# Patient Record
Sex: FEMALE | Race: WHITE | Employment: UNEMPLOYED | URBAN - METROPOLITAN AREA
[De-identification: names, ages, dates, MRNs, and addresses within clinical notes are randomized per-mention and may not be internally consistent; named-entity substitution may affect disease eponyms.]

---

## 2024-04-02 ENCOUNTER — HOSPITAL ENCOUNTER (INPATIENT)
Facility: HOSPITAL | Age: 49
LOS: 7 days | Discharge: NURSING, REHAB OR LONG-TERM CARE | DRG: 493 | End: 2024-04-09
Attending: STUDENT IN AN ORGANIZED HEALTH CARE EDUCATION/TRAINING PROGRAM | Admitting: ORTHOPAEDIC SURGERY
Payer: COMMERCIAL

## 2024-04-02 ENCOUNTER — APPOINTMENT (OUTPATIENT)
Facility: HOSPITAL | Age: 49
DRG: 493 | End: 2024-04-02
Payer: COMMERCIAL

## 2024-04-02 DIAGNOSIS — S93.402A SPRAIN OF LEFT ANKLE, UNSPECIFIED LIGAMENT, INITIAL ENCOUNTER: ICD-10-CM

## 2024-04-02 DIAGNOSIS — S82.851A CLOSED RIGHT TRIMALLEOLAR FRACTURE, INITIAL ENCOUNTER: ICD-10-CM
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
## 2024-04-02 DIAGNOSIS — S82.851A CLOSED TRIMALLEOLAR FRACTURE OF RIGHT ANKLE, INITIAL ENCOUNTER: Primary | ICD-10-CM

## 2024-04-02 LAB
ABO + RH BLD: NORMAL
ANION GAP SERPL CALC-SCNC: 6 MMOL/L (ref 5–15)
BASOPHILS # BLD: 0 K/UL (ref 0–0.1)
BASOPHILS NFR BLD: 0 % (ref 0–1)
BLOOD GROUP ANTIBODIES SERPL: NORMAL
BUN SERPL-MCNC: 18 MG/DL (ref 6–20)
BUN/CREAT SERPL: 22 (ref 12–20)
CALCIUM SERPL-MCNC: 9.1 MG/DL (ref 8.5–10.1)
CHLORIDE SERPL-SCNC: 104 MMOL/L (ref 97–108)
CO2 SERPL-SCNC: 25 MMOL/L (ref 21–32)
CREAT SERPL-MCNC: 0.83 MG/DL (ref 0.55–1.02)
DIFFERENTIAL METHOD BLD: ABNORMAL
EOSINOPHIL # BLD: 0 K/UL (ref 0–0.4)
EOSINOPHIL NFR BLD: 0 % (ref 0–7)
ERYTHROCYTE [DISTWIDTH] IN BLOOD BY AUTOMATED COUNT: 14 % (ref 11.5–14.5)
GLUCOSE SERPL-MCNC: 108 MG/DL (ref 65–100)
HCT VFR BLD AUTO: 39.8 % (ref 35–47)
HGB BLD-MCNC: 13.2 G/DL (ref 11.5–16)
IMM GRANULOCYTES # BLD AUTO: 0.1 K/UL (ref 0–0.04)
IMM GRANULOCYTES NFR BLD AUTO: 1 % (ref 0–0.5)
INR PPP: 1 (ref 0.9–1.1)
LYMPHOCYTES # BLD: 2.4 K/UL (ref 0.8–3.5)
LYMPHOCYTES NFR BLD: 15 % (ref 12–49)
MCH RBC QN AUTO: 29.3 PG (ref 26–34)
MCHC RBC AUTO-ENTMCNC: 33.2 G/DL (ref 30–36.5)
MCV RBC AUTO: 88.2 FL (ref 80–99)
MONOCYTES # BLD: 1.4 K/UL (ref 0–1)
MONOCYTES NFR BLD: 8 % (ref 5–13)
NEUTS SEG # BLD: 12.4 K/UL (ref 1.8–8)
NEUTS SEG NFR BLD: 76 % (ref 32–75)
NRBC # BLD: 0 K/UL (ref 0–0.01)
NRBC BLD-RTO: 0 PER 100 WBC
PLATELET # BLD AUTO: 336 K/UL (ref 150–400)
PMV BLD AUTO: 9.2 FL (ref 8.9–12.9)
POTASSIUM SERPL-SCNC: 3.8 MMOL/L (ref 3.5–5.1)
PROTHROMBIN TIME: 10.7 SEC (ref 9–11.1)
RBC # BLD AUTO: 4.51 M/UL (ref 3.8–5.2)
SODIUM SERPL-SCNC: 135 MMOL/L (ref 136–145)
SPECIMEN EXP DATE BLD: NORMAL
WBC # BLD AUTO: 16.4 K/UL (ref 3.6–11)

## 2024-04-02 PROCEDURE — 2580000003 HC RX 258: Performed by: NURSE PRACTITIONER

## 2024-04-02 PROCEDURE — 6360000002 HC RX W HCPCS: Performed by: ORTHOPAEDIC SURGERY

## 2024-04-02 PROCEDURE — 6370000000 HC RX 637 (ALT 250 FOR IP): Performed by: NURSE PRACTITIONER

## 2024-04-02 PROCEDURE — 99285 EMERGENCY DEPT VISIT HI MDM: CPT

## 2024-04-02 PROCEDURE — 86850 RBC ANTIBODY SCREEN: CPT

## 2024-04-02 PROCEDURE — 1100000000 HC RM PRIVATE

## 2024-04-02 PROCEDURE — 6360000002 HC RX W HCPCS: Performed by: NURSE PRACTITIONER

## 2024-04-02 PROCEDURE — 80048 BASIC METABOLIC PNL TOTAL CA: CPT

## 2024-04-02 PROCEDURE — APPNB30 APP NON BILLABLE TIME 0-30 MINS: Performed by: NURSE PRACTITIONER

## 2024-04-02 PROCEDURE — 85025 COMPLETE CBC W/AUTO DIFF WBC: CPT

## 2024-04-02 PROCEDURE — 71045 X-RAY EXAM CHEST 1 VIEW: CPT

## 2024-04-02 PROCEDURE — 86901 BLOOD TYPING SEROLOGIC RH(D): CPT

## 2024-04-02 PROCEDURE — 93005 ELECTROCARDIOGRAM TRACING: CPT | Performed by: STUDENT IN AN ORGANIZED HEALTH CARE EDUCATION/TRAINING PROGRAM

## 2024-04-02 PROCEDURE — 86900 BLOOD TYPING SEROLOGIC ABO: CPT

## 2024-04-02 PROCEDURE — 36415 COLL VENOUS BLD VENIPUNCTURE: CPT

## 2024-04-02 PROCEDURE — 29515 APPLICATION SHORT LEG SPLINT: CPT

## 2024-04-02 PROCEDURE — 85610 PROTHROMBIN TIME: CPT

## 2024-04-02 PROCEDURE — 6360000002 HC RX W HCPCS: Performed by: STUDENT IN AN ORGANIZED HEALTH CARE EDUCATION/TRAINING PROGRAM

## 2024-04-02 RX ORDER — POLYETHYLENE GLYCOL 3350 17 G/17G
17 POWDER, FOR SOLUTION ORAL DAILY
Status: DISCONTINUED | OUTPATIENT
Start: 2024-04-02 | End: 2024-04-09 | Stop reason: HOSPADM

## 2024-04-02 RX ORDER — OXYCODONE HYDROCHLORIDE 5 MG/1
5 TABLET ORAL EVERY 4 HOURS PRN
Status: DISCONTINUED | OUTPATIENT
Start: 2024-04-02 | End: 2024-04-09 | Stop reason: HOSPADM

## 2024-04-02 RX ORDER — ENOXAPARIN SODIUM 100 MG/ML
30 INJECTION SUBCUTANEOUS 2 TIMES DAILY
Status: DISCONTINUED | OUTPATIENT
Start: 2024-04-02 | End: 2024-04-09 | Stop reason: HOSPADM

## 2024-04-02 RX ORDER — ONDANSETRON 4 MG/1
4 TABLET, ORALLY DISINTEGRATING ORAL EVERY 8 HOURS PRN
Status: DISCONTINUED | OUTPATIENT
Start: 2024-04-02 | End: 2024-04-09 | Stop reason: HOSPADM

## 2024-04-02 RX ORDER — ONDANSETRON 2 MG/ML
4 INJECTION INTRAMUSCULAR; INTRAVENOUS EVERY 6 HOURS PRN
Status: DISCONTINUED | OUTPATIENT
Start: 2024-04-02 | End: 2024-04-09 | Stop reason: HOSPADM

## 2024-04-02 RX ORDER — ENOXAPARIN SODIUM 100 MG/ML
30 INJECTION SUBCUTANEOUS 2 TIMES DAILY
Status: DISCONTINUED | OUTPATIENT
Start: 2024-04-02 | End: 2024-04-02

## 2024-04-02 RX ORDER — LOSARTAN POTASSIUM 50 MG/1
100 TABLET ORAL DAILY
Status: DISCONTINUED | OUTPATIENT
Start: 2024-04-03 | End: 2024-04-09 | Stop reason: HOSPADM

## 2024-04-02 RX ORDER — ENOXAPARIN SODIUM 100 MG/ML
40 INJECTION SUBCUTANEOUS DAILY
Status: DISCONTINUED | OUTPATIENT
Start: 2024-04-02 | End: 2024-04-02 | Stop reason: DRUGHIGH

## 2024-04-02 RX ORDER — SODIUM CHLORIDE 9 MG/ML
INJECTION, SOLUTION INTRAVENOUS PRN
Status: DISCONTINUED | OUTPATIENT
Start: 2024-04-02 | End: 2024-04-04 | Stop reason: SDUPTHER

## 2024-04-02 RX ORDER — SODIUM CHLORIDE 0.9 % (FLUSH) 0.9 %
5-40 SYRINGE (ML) INJECTION PRN
Status: DISCONTINUED | OUTPATIENT
Start: 2024-04-02 | End: 2024-04-09 | Stop reason: HOSPADM

## 2024-04-02 RX ORDER — OXYCODONE HYDROCHLORIDE 5 MG/1
10 TABLET ORAL EVERY 4 HOURS PRN
Status: DISCONTINUED | OUTPATIENT
Start: 2024-04-02 | End: 2024-04-09 | Stop reason: HOSPADM

## 2024-04-02 RX ORDER — ONDANSETRON 2 MG/ML
4 INJECTION INTRAMUSCULAR; INTRAVENOUS ONCE
Status: COMPLETED | OUTPATIENT
Start: 2024-04-02 | End: 2024-04-02

## 2024-04-02 RX ORDER — SODIUM CHLORIDE 0.9 % (FLUSH) 0.9 %
5-40 SYRINGE (ML) INJECTION EVERY 12 HOURS SCHEDULED
Status: DISCONTINUED | OUTPATIENT
Start: 2024-04-02 | End: 2024-04-09 | Stop reason: HOSPADM

## 2024-04-02 RX ORDER — ACETAMINOPHEN 325 MG/1
650 TABLET ORAL EVERY 6 HOURS
Status: DISCONTINUED | OUTPATIENT
Start: 2024-04-02 | End: 2024-04-09 | Stop reason: HOSPADM

## 2024-04-02 RX ORDER — SODIUM CHLORIDE 9 MG/ML
INJECTION, SOLUTION INTRAVENOUS CONTINUOUS
Status: DISCONTINUED | OUTPATIENT
Start: 2024-04-02 | End: 2024-04-09 | Stop reason: HOSPADM

## 2024-04-02 RX ADMIN — ACETAMINOPHEN 650 MG: 325 TABLET ORAL at 16:32

## 2024-04-02 RX ADMIN — HYDROMORPHONE HYDROCHLORIDE 0.5 MG: 1 INJECTION, SOLUTION INTRAMUSCULAR; INTRAVENOUS; SUBCUTANEOUS at 13:18

## 2024-04-02 RX ADMIN — SODIUM CHLORIDE: 9 INJECTION, SOLUTION INTRAVENOUS at 21:34

## 2024-04-02 RX ADMIN — HYDROMORPHONE HYDROCHLORIDE 0.5 MG: 1 INJECTION, SOLUTION INTRAMUSCULAR; INTRAVENOUS; SUBCUTANEOUS at 20:45

## 2024-04-02 RX ADMIN — POLYETHYLENE GLYCOL 3350 17 G: 17 POWDER, FOR SOLUTION ORAL at 16:32

## 2024-04-02 RX ADMIN — ONDANSETRON 4 MG: 2 INJECTION INTRAMUSCULAR; INTRAVENOUS at 13:18

## 2024-04-02 RX ADMIN — OXYCODONE 10 MG: 5 TABLET ORAL at 18:56

## 2024-04-02 RX ADMIN — ENOXAPARIN SODIUM 30 MG: 100 INJECTION SUBCUTANEOUS at 21:47

## 2024-04-02 RX ADMIN — HYDROMORPHONE HYDROCHLORIDE 0.5 MG: 1 INJECTION, SOLUTION INTRAMUSCULAR; INTRAVENOUS; SUBCUTANEOUS at 23:31

## 2024-04-02 RX ADMIN — HYDROMORPHONE HYDROCHLORIDE 0.5 MG: 1 INJECTION, SOLUTION INTRAMUSCULAR; INTRAVENOUS; SUBCUTANEOUS at 16:33

## 2024-04-02 RX ADMIN — SODIUM CHLORIDE: 9 INJECTION, SOLUTION INTRAVENOUS at 16:54

## 2024-04-02 RX ADMIN — ACETAMINOPHEN 650 MG: 325 TABLET ORAL at 21:45

## 2024-04-02 ASSESSMENT — PAIN SCALES - GENERAL
PAINLEVEL_OUTOF10: 9
PAINLEVEL_OUTOF10: 10
PAINLEVEL_OUTOF10: 7
PAINLEVEL_OUTOF10: 9
PAINLEVEL_OUTOF10: 8
PAINLEVEL_OUTOF10: 7
PAINLEVEL_OUTOF10: 8

## 2024-04-02 ASSESSMENT — PAIN DESCRIPTION - LOCATION
LOCATION: ANKLE
LOCATION: LEG
LOCATION: ANKLE
LOCATION: ANKLE
LOCATION: ANKLE;HIP
LOCATION: ANKLE
LOCATION: ANKLE
LOCATION: LEG

## 2024-04-02 ASSESSMENT — PAIN DESCRIPTION - ORIENTATION
ORIENTATION: RIGHT;LEFT
ORIENTATION: LEFT;RIGHT
ORIENTATION: RIGHT;LEFT
ORIENTATION: RIGHT
ORIENTATION: RIGHT;LEFT
ORIENTATION: RIGHT;LEFT

## 2024-04-02 ASSESSMENT — PAIN DESCRIPTION - DESCRIPTORS
DESCRIPTORS: STABBING
DESCRIPTORS: ACHING
DESCRIPTORS: ACHING
DESCRIPTORS: POUNDING;STABBING

## 2024-04-02 ASSESSMENT — PAIN DESCRIPTION - PAIN TYPE
TYPE: ACUTE PAIN
TYPE: ACUTE PAIN

## 2024-04-02 ASSESSMENT — PAIN - FUNCTIONAL ASSESSMENT
PAIN_FUNCTIONAL_ASSESSMENT: 0-10
PAIN_FUNCTIONAL_ASSESSMENT: PREVENTS OR INTERFERES SOME ACTIVE ACTIVITIES AND ADLS

## 2024-04-02 ASSESSMENT — PAIN DESCRIPTION - FREQUENCY: FREQUENCY: CONTINUOUS

## 2024-04-02 NOTE — ED PROVIDER NOTES
[facility] EMERGENCY DEPT  EMERGENCY DEPARTMENT ENCOUNTER      Pt Name: Lola Gupta  MRN: [id number]  Birthdate 1975  Date of evaluation: 4/2/2024  Provider: Jennifer Chase DO    CHIEF COMPLAINT       Chief Complaint   Patient presents with    Leg Pain         HISTORY OF PRESENT ILLNESS    HPI    Lola Gupta is a 48 y.o. female with a history of hypertension, hyperlipidemia, narcolepsy who presents to the emergency department for admission.  Patient sustained a fall yesterday evening, was seen at an outside hospital ([facility]) and was diagnosed with a right trimalleolar fracture and left ankle sprain.  She has been unable to ambulate given the bilateral ankle injury.  She was seen at [facility] today, Dr. Cárdenas, with plan for surgery on Thursday but was referred to the emergency department for admission given her inability to ambulate.  She denies any other trauma or injury with    Nursing Notes were reviewed.    REVIEW OF SYSTEMS       Review of Systems   Constitutional:  Negative for fever.   Eyes:  Negative for pain and discharge.   Musculoskeletal:  Positive for arthralgias.   Neurological:  Negative for syncope.           PAST MEDICAL HISTORY   No past medical history on file.      SURGICAL HISTORY     No past surgical history on file.      CURRENT MEDICATIONS       Previous Medications    No medications on file       ALLERGIES     Patient has no known allergies.    FAMILY HISTORY     No family history on file.       SOCIAL HISTORY       Social History     Socioeconomic History    Marital status:            PHYSICAL EXAM       ED Triage Vitals [04/02/24 1231]   BP Temp Temp Source Pulse Respirations SpO2 Height Weight - Scale   123/80 97.9 °F (36.6 °C) Oral 98 16 97 % 1.702 m (5' 7\") 102.1 kg (225 lb)       Body mass index is 35.24 kg/m².    Physical Exam  Vitals and nursing note reviewed.   Constitutional:       General: She is in acute distress.      Appearance: Normal

## 2024-04-02 NOTE — CONSULTS
Pt seen and admitted to Dr. Cárdenas with plan for ORIF Thursday, please see H&P for full plan of care

## 2024-04-02 NOTE — H&P
ORTHOPAEDIC CONSULT NOTE    Subjective:     Date of Consultation:  April 2, 2024      Lola Gupta is a 48 y.o. female with PMH of narcolepsy, HTN who is being seen for admission after an office visit today with Dr. Cárdenas for R ankle fracture and L ankle sprain. Pt reports she was seen last PM in the ED at [facility] after fall, she missed a step and fell. Work up at that time reveled a L ankle sprain and a R trimal fracture that was splinted. Pt was then sent to home with plan to follow up with Dr. Cárdenas in the office for surgical planning, pt was then sent to the ED at  due to pain and inability to care for herself at home due to bilat ankle injuries. Pt has not had pain medications since leaving the ED and reports pain 10/10 at this time.     Patient Active Problem List    Diagnosis Date Noted    Closed right trimalleolar fracture 04/02/2024    Left ankle sprain 04/02/2024    Closed right trimalleolar fracture, initial encounter 04/02/2024     No family history on file.   Social History     Tobacco Use    Smoking status: Not on file    Smokeless tobacco: Not on file   Substance Use Topics    Alcohol use: Not on file     Past Medical History:   Diagnosis Date    HTN (hypertension)     Narcolepsy       No past surgical history on file.   Prior to Admission medications    Not on File     Current Facility-Administered Medications   Medication Dose Route Frequency    sodium chloride flush 0.9 % injection 5-40 mL  5-40 mL IntraVENous 2 times per day    sodium chloride flush 0.9 % injection 5-40 mL  5-40 mL IntraVENous PRN    0.9 % sodium chloride infusion   IntraVENous PRN    ondansetron (ZOFRAN-ODT) disintegrating tablet 4 mg  4 mg Oral Q8H PRN    Or    ondansetron (ZOFRAN) injection 4 mg  4 mg IntraVENous Q6H PRN    polyethylene glycol (GLYCOLAX) packet 17 g  17 g Oral Daily    0.9 % sodium chloride infusion   IntraVENous Continuous    acetaminophen (TYLENOL) tablet 650 mg  650 mg Oral Q6H    oxyCODONE

## 2024-04-02 NOTE — ED NOTES
Bedside and Verbal shift change report given to Carol (oncoming nurse) by Megan (offgoing nurse). Report included the following information Nurse Handoff Report, ED Encounter Summary, MAR, and Recent Results.

## 2024-04-02 NOTE — ED TRIAGE NOTES
Patient reports she fell down stairs last night and has a fracture in her right leg and a sprain in her left ankle- reports she is scheduled to have surgery on Thursday.    Patient arrived with a splint to her right lower leg- swelling and ecchymosis noted to the left ankle.    Reports she is here to be admitted until surgery because she is unable to bear weight on her legs.

## 2024-04-03 LAB
ANION GAP SERPL CALC-SCNC: 3 MMOL/L (ref 5–15)
APPEARANCE UR: ABNORMAL
BACTERIA URNS QL MICRO: ABNORMAL /HPF
BASOPHILS # BLD: 0 K/UL (ref 0–0.1)
BASOPHILS NFR BLD: 0 % (ref 0–1)
BILIRUB UR QL: NEGATIVE
BUN SERPL-MCNC: 12 MG/DL (ref 6–20)
BUN/CREAT SERPL: 21 (ref 12–20)
CALCIUM SERPL-MCNC: 8.2 MG/DL (ref 8.5–10.1)
CHLORIDE SERPL-SCNC: 105 MMOL/L (ref 97–108)
CO2 SERPL-SCNC: 28 MMOL/L (ref 21–32)
COLOR UR: YELLOW
CREAT SERPL-MCNC: 0.56 MG/DL (ref 0.55–1.02)
DIFFERENTIAL METHOD BLD: ABNORMAL
EKG ATRIAL RATE: 95 BPM
EKG DIAGNOSIS: NORMAL
EKG P AXIS: 83 DEGREES
EKG P-R INTERVAL: 112 MS
EKG Q-T INTERVAL: 338 MS
EKG QRS DURATION: 82 MS
EKG QTC CALCULATION (BAZETT): 424 MS
EKG R AXIS: 5 DEGREES
EKG T AXIS: 16 DEGREES
EKG VENTRICULAR RATE: 95 BPM
EOSINOPHIL # BLD: 0.2 K/UL (ref 0–0.4)
EOSINOPHIL NFR BLD: 2 % (ref 0–7)
EPITH CASTS URNS QL MICRO: ABNORMAL /LPF
ERYTHROCYTE [DISTWIDTH] IN BLOOD BY AUTOMATED COUNT: 13.9 % (ref 11.5–14.5)
GLUCOSE BLD STRIP.AUTO-MCNC: 103 MG/DL (ref 65–117)
GLUCOSE SERPL-MCNC: 109 MG/DL (ref 65–100)
GLUCOSE UR STRIP.AUTO-MCNC: NEGATIVE MG/DL
HCT VFR BLD AUTO: 34.5 % (ref 35–47)
HGB BLD-MCNC: 11.4 G/DL (ref 11.5–16)
HGB UR QL STRIP: ABNORMAL
IMM GRANULOCYTES # BLD AUTO: 0 K/UL (ref 0–0.04)
IMM GRANULOCYTES NFR BLD AUTO: 0 % (ref 0–0.5)
KETONES UR QL STRIP.AUTO: NEGATIVE MG/DL
LEUKOCYTE ESTERASE UR QL STRIP.AUTO: ABNORMAL
LYMPHOCYTES # BLD: 2.4 K/UL (ref 0.8–3.5)
LYMPHOCYTES NFR BLD: 20 % (ref 12–49)
MCH RBC QN AUTO: 29.5 PG (ref 26–34)
MCHC RBC AUTO-ENTMCNC: 33 G/DL (ref 30–36.5)
MCV RBC AUTO: 89.1 FL (ref 80–99)
MONOCYTES # BLD: 1.2 K/UL (ref 0–1)
MONOCYTES NFR BLD: 10 % (ref 5–13)
NEUTS SEG # BLD: 7.9 K/UL (ref 1.8–8)
NEUTS SEG NFR BLD: 68 % (ref 32–75)
NITRITE UR QL STRIP.AUTO: NEGATIVE
NRBC # BLD: 0 K/UL (ref 0–0.01)
NRBC BLD-RTO: 0 PER 100 WBC
PH UR STRIP: 5 (ref 5–8)
PLATELET # BLD AUTO: 270 K/UL (ref 150–400)
PMV BLD AUTO: 9.7 FL (ref 8.9–12.9)
POTASSIUM SERPL-SCNC: 3.8 MMOL/L (ref 3.5–5.1)
PROT UR STRIP-MCNC: ABNORMAL MG/DL
RBC # BLD AUTO: 3.87 M/UL (ref 3.8–5.2)
RBC #/AREA URNS HPF: ABNORMAL /HPF (ref 0–5)
SERVICE CMNT-IMP: NORMAL
SODIUM SERPL-SCNC: 136 MMOL/L (ref 136–145)
SP GR UR REFRACTOMETRY: 1.02 (ref 1–1.03)
SPECIMEN HOLD: NORMAL
URINE CULTURE IF INDICATED: ABNORMAL
UROBILINOGEN UR QL STRIP.AUTO: 1 EU/DL (ref 0.2–1)
WBC # BLD AUTO: 11.7 K/UL (ref 3.6–11)
WBC URNS QL MICRO: ABNORMAL /HPF (ref 0–4)

## 2024-04-03 PROCEDURE — 6360000002 HC RX W HCPCS: Performed by: ORTHOPAEDIC SURGERY

## 2024-04-03 PROCEDURE — 85025 COMPLETE CBC W/AUTO DIFF WBC: CPT

## 2024-04-03 PROCEDURE — 93010 ELECTROCARDIOGRAM REPORT: CPT | Performed by: SPECIALIST

## 2024-04-03 PROCEDURE — 6370000000 HC RX 637 (ALT 250 FOR IP): Performed by: NURSE PRACTITIONER

## 2024-04-03 PROCEDURE — 1100000000 HC RM PRIVATE

## 2024-04-03 PROCEDURE — 6360000002 HC RX W HCPCS: Performed by: NURSE PRACTITIONER

## 2024-04-03 PROCEDURE — 80048 BASIC METABOLIC PNL TOTAL CA: CPT

## 2024-04-03 PROCEDURE — 36415 COLL VENOUS BLD VENIPUNCTURE: CPT

## 2024-04-03 PROCEDURE — 94761 N-INVAS EAR/PLS OXIMETRY MLT: CPT

## 2024-04-03 PROCEDURE — 99232 SBSQ HOSP IP/OBS MODERATE 35: CPT | Performed by: PHYSICIAN ASSISTANT

## 2024-04-03 PROCEDURE — 2580000003 HC RX 258: Performed by: NURSE PRACTITIONER

## 2024-04-03 PROCEDURE — 81001 URINALYSIS AUTO W/SCOPE: CPT

## 2024-04-03 PROCEDURE — 87086 URINE CULTURE/COLONY COUNT: CPT

## 2024-04-03 PROCEDURE — 82962 GLUCOSE BLOOD TEST: CPT

## 2024-04-03 RX ORDER — PANTOPRAZOLE SODIUM 40 MG/1
40 TABLET, DELAYED RELEASE ORAL
Status: DISCONTINUED | OUTPATIENT
Start: 2024-04-04 | End: 2024-04-09 | Stop reason: HOSPADM

## 2024-04-03 RX ADMIN — POLYETHYLENE GLYCOL 3350 17 G: 17 POWDER, FOR SOLUTION ORAL at 08:03

## 2024-04-03 RX ADMIN — LOSARTAN POTASSIUM 100 MG: 50 TABLET, FILM COATED ORAL at 08:05

## 2024-04-03 RX ADMIN — ACETAMINOPHEN 650 MG: 325 TABLET ORAL at 21:09

## 2024-04-03 RX ADMIN — HYDROMORPHONE HYDROCHLORIDE 0.5 MG: 1 INJECTION, SOLUTION INTRAMUSCULAR; INTRAVENOUS; SUBCUTANEOUS at 03:47

## 2024-04-03 RX ADMIN — ACETAMINOPHEN 650 MG: 325 TABLET ORAL at 15:18

## 2024-04-03 RX ADMIN — OXYCODONE 10 MG: 5 TABLET ORAL at 18:12

## 2024-04-03 RX ADMIN — ACETAMINOPHEN 650 MG: 325 TABLET ORAL at 11:22

## 2024-04-03 RX ADMIN — ENOXAPARIN SODIUM 30 MG: 100 INJECTION SUBCUTANEOUS at 08:02

## 2024-04-03 RX ADMIN — HYDROMORPHONE HYDROCHLORIDE 0.5 MG: 1 INJECTION, SOLUTION INTRAMUSCULAR; INTRAVENOUS; SUBCUTANEOUS at 15:21

## 2024-04-03 RX ADMIN — SODIUM CHLORIDE, PRESERVATIVE FREE 10 ML: 5 INJECTION INTRAVENOUS at 08:05

## 2024-04-03 RX ADMIN — OXYCODONE 10 MG: 5 TABLET ORAL at 08:03

## 2024-04-03 RX ADMIN — OXYCODONE 10 MG: 5 TABLET ORAL at 13:56

## 2024-04-03 RX ADMIN — SODIUM CHLORIDE: 9 INJECTION, SOLUTION INTRAVENOUS at 13:59

## 2024-04-03 RX ADMIN — SODIUM CHLORIDE: 9 INJECTION, SOLUTION INTRAVENOUS at 06:19

## 2024-04-03 RX ADMIN — ENOXAPARIN SODIUM 30 MG: 100 INJECTION SUBCUTANEOUS at 21:11

## 2024-04-03 RX ADMIN — SODIUM CHLORIDE, PRESERVATIVE FREE 10 ML: 5 INJECTION INTRAVENOUS at 21:12

## 2024-04-03 RX ADMIN — SERTRALINE HYDROCHLORIDE 50 MG: 50 TABLET ORAL at 08:03

## 2024-04-03 RX ADMIN — OXYCODONE 10 MG: 5 TABLET ORAL at 01:39

## 2024-04-03 RX ADMIN — HYDROMORPHONE HYDROCHLORIDE 0.5 MG: 1 INJECTION, SOLUTION INTRAMUSCULAR; INTRAVENOUS; SUBCUTANEOUS at 19:48

## 2024-04-03 RX ADMIN — ACETAMINOPHEN 650 MG: 325 TABLET ORAL at 03:47

## 2024-04-03 RX ADMIN — HYDROMORPHONE HYDROCHLORIDE 0.5 MG: 1 INJECTION, SOLUTION INTRAMUSCULAR; INTRAVENOUS; SUBCUTANEOUS at 11:22

## 2024-04-03 ASSESSMENT — PAIN DESCRIPTION - DESCRIPTORS
DESCRIPTORS: SHARP;THROBBING
DESCRIPTORS: STABBING;THROBBING
DESCRIPTORS: STABBING
DESCRIPTORS: STABBING;THROBBING
DESCRIPTORS: STABBING;THROBBING

## 2024-04-03 ASSESSMENT — PAIN DESCRIPTION - LOCATION
LOCATION: LEG
LOCATION: ANKLE
LOCATION: LEG

## 2024-04-03 ASSESSMENT — PAIN SCALES - GENERAL
PAINLEVEL_OUTOF10: 9
PAINLEVEL_OUTOF10: 10
PAINLEVEL_OUTOF10: 4
PAINLEVEL_OUTOF10: 6
PAINLEVEL_OUTOF10: 9
PAINLEVEL_OUTOF10: 10
PAINLEVEL_OUTOF10: 0
PAINLEVEL_OUTOF10: 0
PAINLEVEL_OUTOF10: 6
PAINLEVEL_OUTOF10: 4
PAINLEVEL_OUTOF10: 10
PAINLEVEL_OUTOF10: 0
PAINLEVEL_OUTOF10: 10
PAINLEVEL_OUTOF10: 5

## 2024-04-03 ASSESSMENT — PAIN DESCRIPTION - ORIENTATION
ORIENTATION: RIGHT
ORIENTATION: RIGHT
ORIENTATION: RIGHT;LEFT
ORIENTATION: RIGHT
ORIENTATION: RIGHT;LEFT
ORIENTATION: RIGHT

## 2024-04-03 ASSESSMENT — PAIN SCALES - WONG BAKER: WONGBAKER_NUMERICALRESPONSE: NO HURT

## 2024-04-03 NOTE — ADT AUTH CERT
Physician Progress Notes  Notes from 03/31/24 through 04/03/24  Progress Notes by Daren Centeno PA at 4/3/2024  1:14 PM    Author: Daren Centeno PA Service: Orthopedics Author Type: Physician Assistant   Filed: 4/3/2024  1:33 PM Date of Service: 4/3/2024  1:14 PM Status: Signed   : Daren Centeno PA (Physician Assistant)      Orthopaedic Progress Note     April 3, 2024 10:54 AM      Patient: Lola Gupta MRN: [id number]  SSN: [id number]    YOB: 1975  Age: 48 y.o.  Sex: female       Admit date:  4/2/2024  Admitting Physician:  Lorenzo Cárdenas MD   Consulting Physician(s): Treatment Team: Attending Provider: Lorenzo Cárdenas MD; Consulting Provider: Marilyn Edmondson APRN - NP; Registered Nurse: Callie Dorsey RN; Patient Care Tech: Edgar Rosado; Physical Therapist: Tashi De Santiago PT; Occupational Therapist: Quynh Webb OT; Utilization Reviewer: Radha Mendoza RN; : Shanda Montalvo     SUBJECTIVE:      Lola Gupta is a 48 y.o. female is resting in bed.  Complains of pain over night, that was not managed per the patient.  No complaints of nausea, vomiting, dizziness, lightheadedness, chest pain, or shortness of breath.     OBJECTIVE:         Physical Exam:  General: Alert, cooperative, no distress.    Respiratory: Respirations unlabored  Neurological:  Neurovascular exam within normal limits.  Motor: + DF/PF.   Musculoskeletal: Calves soft, supple, non-tender upon palpation.  Splint in place right lower extremity.  CDI splint. Wiggles toes on right and left foot. BCR of all digits.  Left ankle with swelling over ATLF.  No distal fibula pain to palpation. +DP and PT pulses.  BCR of all digits.    SILT BL LE.             Vital Signs:          Patient Vitals for the past 8 hrs:    BP Temp Temp src Pulse Resp SpO2   04/03/24 1152 -- -- -- -- 20 --   04/03/24 1133 118/69 97.9 °F (36.6 °C) Oral 79 20 97 %   04/03/24 1122 -- -- -- -- 18 --   04/03/24 0842 --

## 2024-04-03 NOTE — ED NOTES
TRANSFER - OUT REPORT:    Verbal report given to ANITHA Bond  on Lola Gupta  being transferred to [facility] for routine progression of patient care       Report consisted of patient's Situation, Background, Assessment and   Recommendations(SBAR).     Information from the following report(s) Nurse Handoff Report, ED Encounter Summary, ED SBAR, MAR, and Recent Results was reviewed with the receiving nurse.    [place] Fall Assessment:    Presents to emergency department  because of falls (Syncope, seizure, or loss of consciousness): No  Age > 70: No  Altered Mental Status, Intoxication with alcohol or substance confusion (Disorientation, impaired judgment, poor safety awaremess, or inability to follow instructions): No  Impaired Mobility: Ambulates or transfers with assistive devices or assistance; Unable to ambulate or transer.: Yes  Nursing Judgement: Yes          Lines:   Peripheral IV 04/02/24 Proximal;Right Forearm (Active)   Site Assessment Clean, dry & intact 04/02/24 1235   Line Status Blood return noted;Flushed 04/02/24 1235   Phlebitis Assessment No symptoms 04/02/24 1235   Infiltration Assessment 0 04/02/24 1235   Dressing Status New dressing applied 04/02/24 1235   Dressing Type Transparent 04/02/24 1235   Dressing Intervention New 04/02/24 1235        Opportunity for questions and clarification was provided.      Patient transported with:  [employer]

## 2024-04-04 ENCOUNTER — APPOINTMENT (OUTPATIENT)
Facility: HOSPITAL | Age: 49
DRG: 493 | End: 2024-04-04
Payer: COMMERCIAL

## 2024-04-04 ENCOUNTER — ANESTHESIA (OUTPATIENT)
Facility: HOSPITAL | Age: 49
End: 2024-04-04
Payer: COMMERCIAL

## 2024-04-04 ENCOUNTER — ANESTHESIA EVENT (OUTPATIENT)
Facility: HOSPITAL | Age: 49
End: 2024-04-04
Payer: COMMERCIAL

## 2024-04-04 LAB
BACTERIA SPEC CULT: NORMAL
CC UR VC: NORMAL
HCG UR QL: NEGATIVE
SERVICE CMNT-IMP: NORMAL

## 2024-04-04 PROCEDURE — 6360000002 HC RX W HCPCS: Performed by: ORTHOPAEDIC SURGERY

## 2024-04-04 PROCEDURE — 3600000004 HC SURGERY LEVEL 4 BASE: Performed by: ORTHOPAEDIC SURGERY

## 2024-04-04 PROCEDURE — 7100000000 HC PACU RECOVERY - FIRST 15 MIN: Performed by: ORTHOPAEDIC SURGERY

## 2024-04-04 PROCEDURE — 81025 URINE PREGNANCY TEST: CPT

## 2024-04-04 PROCEDURE — 6360000002 HC RX W HCPCS: Performed by: NURSE PRACTITIONER

## 2024-04-04 PROCEDURE — C9290 INJ, BUPIVACAINE LIPOSOME: HCPCS | Performed by: ANESTHESIOLOGY

## 2024-04-04 PROCEDURE — 6360000002 HC RX W HCPCS: Performed by: NURSE ANESTHETIST, CERTIFIED REGISTERED

## 2024-04-04 PROCEDURE — 6370000000 HC RX 637 (ALT 250 FOR IP): Performed by: NURSE PRACTITIONER

## 2024-04-04 PROCEDURE — 1100000000 HC RM PRIVATE

## 2024-04-04 PROCEDURE — 64445 NJX AA&/STRD SCIATIC NRV IMG: CPT | Performed by: ANESTHESIOLOGY

## 2024-04-04 PROCEDURE — 0QSJ04Z REPOSITION RIGHT FIBULA WITH INTERNAL FIXATION DEVICE, OPEN APPROACH: ICD-10-PCS | Performed by: ORTHOPAEDIC SURGERY

## 2024-04-04 PROCEDURE — 2580000003 HC RX 258: Performed by: ANESTHESIOLOGY

## 2024-04-04 PROCEDURE — 2700000000 HC OXYGEN THERAPY PER DAY

## 2024-04-04 PROCEDURE — 2720000010 HC SURG SUPPLY STERILE: Performed by: ORTHOPAEDIC SURGERY

## 2024-04-04 PROCEDURE — 3700000000 HC ANESTHESIA ATTENDED CARE: Performed by: ORTHOPAEDIC SURGERY

## 2024-04-04 PROCEDURE — 6370000000 HC RX 637 (ALT 250 FOR IP): Performed by: PHYSICIAN ASSISTANT

## 2024-04-04 PROCEDURE — 3700000001 HC ADD 15 MINUTES (ANESTHESIA): Performed by: ORTHOPAEDIC SURGERY

## 2024-04-04 PROCEDURE — 2580000003 HC RX 258: Performed by: NURSE PRACTITIONER

## 2024-04-04 PROCEDURE — C1713 ANCHOR/SCREW BN/BN,TIS/BN: HCPCS | Performed by: ORTHOPAEDIC SURGERY

## 2024-04-04 PROCEDURE — 6360000002 HC RX W HCPCS: Performed by: ANESTHESIOLOGY

## 2024-04-04 PROCEDURE — 6360000002 HC RX W HCPCS: Performed by: PHYSICIAN ASSISTANT

## 2024-04-04 PROCEDURE — BW1C1ZZ FLUOROSCOPY OF LOWER EXTREMITY USING LOW OSMOLAR CONTRAST: ICD-10-PCS | Performed by: ORTHOPAEDIC SURGERY

## 2024-04-04 PROCEDURE — 2580000003 HC RX 258: Performed by: ORTHOPAEDIC SURGERY

## 2024-04-04 PROCEDURE — 0QSG04Z REPOSITION RIGHT TIBIA WITH INTERNAL FIXATION DEVICE, OPEN APPROACH: ICD-10-PCS | Performed by: ORTHOPAEDIC SURGERY

## 2024-04-04 PROCEDURE — 7100000001 HC PACU RECOVERY - ADDTL 15 MIN: Performed by: ORTHOPAEDIC SURGERY

## 2024-04-04 PROCEDURE — 2709999900 HC NON-CHARGEABLE SUPPLY: Performed by: ORTHOPAEDIC SURGERY

## 2024-04-04 PROCEDURE — 94761 N-INVAS EAR/PLS OXIMETRY MLT: CPT

## 2024-04-04 PROCEDURE — 3600000014 HC SURGERY LEVEL 4 ADDTL 15MIN: Performed by: ORTHOPAEDIC SURGERY

## 2024-04-04 PROCEDURE — 73600 X-RAY EXAM OF ANKLE: CPT

## 2024-04-04 PROCEDURE — 2500000003 HC RX 250 WO HCPCS: Performed by: NURSE ANESTHETIST, CERTIFIED REGISTERED

## 2024-04-04 PROCEDURE — 2580000003 HC RX 258: Performed by: PHYSICIAN ASSISTANT

## 2024-04-04 DEVICE — PLATE BNE 3 H MED S STL LOK FOR ANK FRAC MGMT: Type: IMPLANTABLE DEVICE | Site: ANKLE | Status: FUNCTIONAL

## 2024-04-04 DEVICE — KNOTLESS T-ROPE SYN-DESMOSIS REPR TI
Type: IMPLANTABLE DEVICE | Site: ANKLE | Status: FUNCTIONAL
Brand: ARTHREX®

## 2024-04-04 DEVICE — BONE SCREW
Type: IMPLANTABLE DEVICE | Site: ANKLE | Status: FUNCTIONAL
Brand: VARIAX

## 2024-04-04 DEVICE — DISTAL LATERAL FIBULA PLATE, 5 HOLE
Type: IMPLANTABLE DEVICE | Site: ANKLE | Status: FUNCTIONAL
Brand: VARIAX

## 2024-04-04 DEVICE — LOCKING SCREW
Type: IMPLANTABLE DEVICE | Site: ANKLE | Status: FUNCTIONAL
Brand: VARIAX

## 2024-04-04 DEVICE — SCREW BNE L30MM DIA3.5MM CORT ANK S STL NONLOCKING LO PROF: Type: IMPLANTABLE DEVICE | Site: ANKLE | Status: FUNCTIONAL

## 2024-04-04 RX ORDER — DIPHENHYDRAMINE HYDROCHLORIDE 50 MG/ML
12.5 INJECTION INTRAMUSCULAR; INTRAVENOUS
Status: DISCONTINUED | OUTPATIENT
Start: 2024-04-04 | End: 2024-04-04 | Stop reason: HOSPADM

## 2024-04-04 RX ORDER — LIDOCAINE HYDROCHLORIDE 20 MG/ML
INJECTION, SOLUTION EPIDURAL; INFILTRATION; INTRACAUDAL; PERINEURAL PRN
Status: DISCONTINUED | OUTPATIENT
Start: 2024-04-04 | End: 2024-04-04 | Stop reason: SDUPTHER

## 2024-04-04 RX ORDER — MIDAZOLAM HYDROCHLORIDE 1 MG/ML
INJECTION INTRAMUSCULAR; INTRAVENOUS PRN
Status: DISCONTINUED | OUTPATIENT
Start: 2024-04-04 | End: 2024-04-04 | Stop reason: SDUPTHER

## 2024-04-04 RX ORDER — OLMESARTAN MEDOXOMIL 40 MG/1
40 TABLET ORAL DAILY
COMMUNITY
Start: 2024-02-10

## 2024-04-04 RX ORDER — SODIUM CHLORIDE 9 MG/ML
INJECTION, SOLUTION INTRAVENOUS PRN
Status: DISCONTINUED | OUTPATIENT
Start: 2024-04-04 | End: 2024-04-09 | Stop reason: HOSPADM

## 2024-04-04 RX ORDER — PITOLISANT HYDROCHLORIDE 17.8 MG/1
TABLET, FILM COATED ORAL
COMMUNITY
Start: 2024-03-11

## 2024-04-04 RX ORDER — SODIUM CHLORIDE, SODIUM LACTATE, POTASSIUM CHLORIDE, CALCIUM CHLORIDE 600; 310; 30; 20 MG/100ML; MG/100ML; MG/100ML; MG/100ML
INJECTION, SOLUTION INTRAVENOUS CONTINUOUS
Status: DISCONTINUED | OUTPATIENT
Start: 2024-04-04 | End: 2024-04-04 | Stop reason: HOSPADM

## 2024-04-04 RX ORDER — LIDOCAINE HYDROCHLORIDE 10 MG/ML
1 INJECTION, SOLUTION EPIDURAL; INFILTRATION; INTRACAUDAL; PERINEURAL
Status: DISCONTINUED | OUTPATIENT
Start: 2024-04-04 | End: 2024-04-04 | Stop reason: HOSPADM

## 2024-04-04 RX ORDER — SODIUM CHLORIDE 0.9 % (FLUSH) 0.9 %
5-40 SYRINGE (ML) INJECTION PRN
Status: DISCONTINUED | OUTPATIENT
Start: 2024-04-04 | End: 2024-04-09 | Stop reason: HOSPADM

## 2024-04-04 RX ORDER — PROPOFOL 10 MG/ML
INJECTION, EMULSION INTRAVENOUS PRN
Status: DISCONTINUED | OUTPATIENT
Start: 2024-04-04 | End: 2024-04-04 | Stop reason: SDUPTHER

## 2024-04-04 RX ORDER — DEXAMETHASONE SODIUM PHOSPHATE 4 MG/ML
INJECTION, SOLUTION INTRA-ARTICULAR; INTRALESIONAL; INTRAMUSCULAR; INTRAVENOUS; SOFT TISSUE PRN
Status: DISCONTINUED | OUTPATIENT
Start: 2024-04-04 | End: 2024-04-04 | Stop reason: SDUPTHER

## 2024-04-04 RX ORDER — FENTANYL CITRATE 50 UG/ML
100 INJECTION, SOLUTION INTRAMUSCULAR; INTRAVENOUS
Status: DISCONTINUED | OUTPATIENT
Start: 2024-04-04 | End: 2024-04-04 | Stop reason: HOSPADM

## 2024-04-04 RX ORDER — ONDANSETRON 2 MG/ML
INJECTION INTRAMUSCULAR; INTRAVENOUS PRN
Status: DISCONTINUED | OUTPATIENT
Start: 2024-04-04 | End: 2024-04-04 | Stop reason: SDUPTHER

## 2024-04-04 RX ORDER — DEXTROAMPHETAMINE SACCHARATE, AMPHETAMINE ASPARTATE, DEXTROAMPHETAMINE SULFATE AND AMPHETAMINE SULFATE 2.5; 2.5; 2.5; 2.5 MG/1; MG/1; MG/1; MG/1
TABLET ORAL
COMMUNITY
Start: 2024-02-23

## 2024-04-04 RX ORDER — ONDANSETRON 2 MG/ML
4 INJECTION INTRAMUSCULAR; INTRAVENOUS
Status: DISCONTINUED | OUTPATIENT
Start: 2024-04-04 | End: 2024-04-04 | Stop reason: HOSPADM

## 2024-04-04 RX ORDER — ROPIVACAINE HYDROCHLORIDE 5 MG/ML
INJECTION, SOLUTION EPIDURAL; INFILTRATION; PERINEURAL PRN
Status: DISCONTINUED | OUTPATIENT
Start: 2024-04-04 | End: 2024-04-04 | Stop reason: SDUPTHER

## 2024-04-04 RX ORDER — SODIUM CHLORIDE 0.9 % (FLUSH) 0.9 %
5-40 SYRINGE (ML) INJECTION EVERY 12 HOURS SCHEDULED
Status: DISCONTINUED | OUTPATIENT
Start: 2024-04-04 | End: 2024-04-09 | Stop reason: HOSPADM

## 2024-04-04 RX ORDER — MIDAZOLAM HYDROCHLORIDE 2 MG/2ML
2 INJECTION, SOLUTION INTRAMUSCULAR; INTRAVENOUS
Status: DISCONTINUED | OUTPATIENT
Start: 2024-04-04 | End: 2024-04-04 | Stop reason: HOSPADM

## 2024-04-04 RX ORDER — FENTANYL CITRATE 50 UG/ML
INJECTION, SOLUTION INTRAMUSCULAR; INTRAVENOUS PRN
Status: DISCONTINUED | OUTPATIENT
Start: 2024-04-04 | End: 2024-04-04 | Stop reason: SDUPTHER

## 2024-04-04 RX ORDER — NALOXONE HYDROCHLORIDE 0.4 MG/ML
INJECTION, SOLUTION INTRAMUSCULAR; INTRAVENOUS; SUBCUTANEOUS PRN
Status: DISCONTINUED | OUTPATIENT
Start: 2024-04-04 | End: 2024-04-04 | Stop reason: HOSPADM

## 2024-04-04 RX ORDER — BUPIVACAINE HYDROCHLORIDE 2.5 MG/ML
INJECTION, SOLUTION EPIDURAL; INFILTRATION; INTRACAUDAL PRN
Status: DISCONTINUED | OUTPATIENT
Start: 2024-04-04 | End: 2024-04-04 | Stop reason: SDUPTHER

## 2024-04-04 RX ADMIN — SODIUM CHLORIDE, PRESERVATIVE FREE 10 ML: 5 INJECTION INTRAVENOUS at 20:06

## 2024-04-04 RX ADMIN — MIDAZOLAM HYDROCHLORIDE 2 MG: 1 INJECTION, SOLUTION INTRAMUSCULAR; INTRAVENOUS at 10:43

## 2024-04-04 RX ADMIN — FENTANYL CITRATE 50 MCG: 50 INJECTION, SOLUTION INTRAMUSCULAR; INTRAVENOUS at 12:06

## 2024-04-04 RX ADMIN — MIDAZOLAM HYDROCHLORIDE 2 MG: 1 INJECTION, SOLUTION INTRAMUSCULAR; INTRAVENOUS at 11:20

## 2024-04-04 RX ADMIN — ACETAMINOPHEN 650 MG: 325 TABLET ORAL at 15:30

## 2024-04-04 RX ADMIN — ACETAMINOPHEN 650 MG: 325 TABLET ORAL at 04:08

## 2024-04-04 RX ADMIN — OXYCODONE 10 MG: 5 TABLET ORAL at 23:49

## 2024-04-04 RX ADMIN — ACETAMINOPHEN 650 MG: 325 TABLET ORAL at 23:49

## 2024-04-04 RX ADMIN — PROPOFOL 160 MG: 10 INJECTION, EMULSION INTRAVENOUS at 11:20

## 2024-04-04 RX ADMIN — LIDOCAINE HYDROCHLORIDE 60 MG: 20 INJECTION, SOLUTION EPIDURAL; INFILTRATION; INTRACAUDAL; PERINEURAL at 11:20

## 2024-04-04 RX ADMIN — SERTRALINE HYDROCHLORIDE 50 MG: 50 TABLET ORAL at 15:30

## 2024-04-04 RX ADMIN — HYDROMORPHONE HYDROCHLORIDE 0.5 MG: 1 INJECTION, SOLUTION INTRAMUSCULAR; INTRAVENOUS; SUBCUTANEOUS at 21:09

## 2024-04-04 RX ADMIN — BUPIVACAINE HYDROCHLORIDE 20 ML: 2.5 INJECTION, SOLUTION EPIDURAL; INFILTRATION; INTRACAUDAL; PERINEURAL at 10:48

## 2024-04-04 RX ADMIN — SODIUM CHLORIDE: 9 INJECTION, SOLUTION INTRAVENOUS at 23:52

## 2024-04-04 RX ADMIN — ROPIVACAINE HYDROCHLORIDE 10 ML: 5 INJECTION, SOLUTION EPIDURAL; INFILTRATION; PERINEURAL at 10:53

## 2024-04-04 RX ADMIN — SODIUM CHLORIDE, POTASSIUM CHLORIDE, SODIUM LACTATE AND CALCIUM CHLORIDE: 600; 310; 30; 20 INJECTION, SOLUTION INTRAVENOUS at 10:17

## 2024-04-04 RX ADMIN — FENTANYL CITRATE 50 MCG: 50 INJECTION, SOLUTION INTRAMUSCULAR; INTRAVENOUS at 11:20

## 2024-04-04 RX ADMIN — BUPIVACAINE 10 ML: 13.3 INJECTION, SUSPENSION, LIPOSOMAL INFILTRATION at 10:48

## 2024-04-04 RX ADMIN — PROPOFOL 25 MG: 10 INJECTION, EMULSION INTRAVENOUS at 13:26

## 2024-04-04 RX ADMIN — PANTOPRAZOLE SODIUM 40 MG: 40 TABLET, DELAYED RELEASE ORAL at 06:17

## 2024-04-04 RX ADMIN — CEFAZOLIN 2000 MG: 2 INJECTION, POWDER, FOR SOLUTION INTRAMUSCULAR; INTRAVENOUS at 20:05

## 2024-04-04 RX ADMIN — PROPOFOL 50 MG: 10 INJECTION, EMULSION INTRAVENOUS at 13:13

## 2024-04-04 RX ADMIN — SODIUM CHLORIDE: 9 INJECTION, SOLUTION INTRAVENOUS at 06:20

## 2024-04-04 RX ADMIN — PROPOFOL 25 MG: 10 INJECTION, EMULSION INTRAVENOUS at 13:22

## 2024-04-04 RX ADMIN — FENTANYL CITRATE 100 MCG: 50 INJECTION, SOLUTION INTRAMUSCULAR; INTRAVENOUS at 10:43

## 2024-04-04 RX ADMIN — WATER 2000 MG: 1 INJECTION INTRAMUSCULAR; INTRAVENOUS; SUBCUTANEOUS at 11:45

## 2024-04-04 RX ADMIN — HYDROMORPHONE HYDROCHLORIDE 0.5 MG: 1 INJECTION, SOLUTION INTRAMUSCULAR; INTRAVENOUS; SUBCUTANEOUS at 06:27

## 2024-04-04 RX ADMIN — DEXAMETHASONE SODIUM PHOSPHATE 8 MG: 4 INJECTION, SOLUTION INTRAMUSCULAR; INTRAVENOUS at 12:02

## 2024-04-04 RX ADMIN — ONDANSETRON 4 MG: 2 INJECTION INTRAMUSCULAR; INTRAVENOUS at 12:39

## 2024-04-04 RX ADMIN — OXYCODONE 10 MG: 5 TABLET ORAL at 20:04

## 2024-04-04 RX ADMIN — POLYETHYLENE GLYCOL 3350 17 G: 17 POWDER, FOR SOLUTION ORAL at 15:30

## 2024-04-04 RX ADMIN — HYDROMORPHONE HYDROCHLORIDE 0.5 MG: 1 INJECTION, SOLUTION INTRAMUSCULAR; INTRAVENOUS; SUBCUTANEOUS at 01:40

## 2024-04-04 ASSESSMENT — PAIN SCALES - GENERAL
PAINLEVEL_OUTOF10: 0
PAINLEVEL_OUTOF10: 10
PAINLEVEL_OUTOF10: 7
PAINLEVEL_OUTOF10: 7
PAINLEVEL_OUTOF10: 0
PAINLEVEL_OUTOF10: 7
PAINLEVEL_OUTOF10: 9
PAINLEVEL_OUTOF10: 0
PAINLEVEL_OUTOF10: 7
PAINLEVEL_OUTOF10: 0

## 2024-04-04 ASSESSMENT — PAIN - FUNCTIONAL ASSESSMENT
PAIN_FUNCTIONAL_ASSESSMENT: 0-10
PAIN_FUNCTIONAL_ASSESSMENT: PREVENTS OR INTERFERES WITH MANY ACTIVE NOT PASSIVE ACTIVITIES

## 2024-04-04 ASSESSMENT — PAIN DESCRIPTION - ORIENTATION
ORIENTATION: LEFT
ORIENTATION: LEFT;RIGHT
ORIENTATION: LEFT
ORIENTATION: RIGHT
ORIENTATION: RIGHT

## 2024-04-04 ASSESSMENT — PAIN DESCRIPTION - LOCATION
LOCATION: LEG
LOCATION: ANKLE
LOCATION: ANKLE
LOCATION: LEG
LOCATION: FOOT

## 2024-04-04 ASSESSMENT — PAIN DESCRIPTION - DESCRIPTORS
DESCRIPTORS: THROBBING;STABBING
DESCRIPTORS: THROBBING
DESCRIPTORS: THROBBING;STABBING;SHOOTING;SHARP

## 2024-04-04 NOTE — PERIOP NOTE
TRANSFER - OUT REPORT:    Verbal report given to August on Lola Gupta  being transferred to [facility] for routine post-op       Report consisted of patient's Situation, Background, Assessment and   Recommendations(SBAR).     Information from the following report(s) Adult Overview, Surgery Report, MAR, Cardiac Rhythm NSR, and Neuro Assessment was reviewed with the receiving nurse.           Lines:   Peripheral IV 04/02/24 Proximal;Right Forearm (Active)   Site Assessment Clean, dry & intact 04/04/24 1401   Line Status Infusing 04/04/24 1401   Line Care Connections checked and tightened 04/03/24 2002   Phlebitis Assessment No symptoms 04/04/24 1401   Infiltration Assessment 0 04/04/24 1401   Alcohol Cap Used Yes 04/04/24 1401   Dressing Status Clean, dry & intact 04/04/24 1401   Dressing Type Transparent 04/04/24 1401   Dressing Intervention New 04/02/24 1235        Opportunity for questions and clarification was provided.      Patient transported with:  [facility]

## 2024-04-04 NOTE — ANESTHESIA PROCEDURE NOTES
Peripheral Block    Patient location during procedure: pre-op  Reason for block: procedure for pain, post-op pain management, primary anesthetic and at surgeon's request  Start time: 4/4/2024 10:43 AM  End time: 4/4/2024 10:53 AM  Staffing  Performed: anesthesiologist and resident/CRNA   Anesthesiologist: Artis Lacey MD  Resident/CRNA: Manolo Velez APRN - CRNA  Performed by: Manolo Velez APRN - CRNA  Authorized by: Atris Lacey MD    Preanesthetic Checklist  Completed: patient identified, IV checked, site marked, risks and benefits discussed, surgical/procedural consents, timeout performed, anesthesia consent given, oxygen available and monitors applied/VS acknowledged  Peripheral Block   Patient position: supine  Prep: ChloraPrep  Provider prep: mask and sterile gloves  Patient monitoring: cardiac monitor, continuous pulse ox, continuous capnometry, frequent blood pressure checks, IV access, oxygen and responsive to questions  Block type: Sciatic and Femoral  Laterality: right  Injection technique: single-shot  Guidance: ultrasound guided    Needle   Needle type: Other   Needle gauge: 21 G  Needle localization: ultrasound guidance  Needle length: 10 cmOther needle type: STIMUPLEX  Assessment   Injection assessment: negative aspiration for heme, no paresthesia on injection, local visualized surrounding nerve on ultrasound and no intravascular symptoms  Hemodynamics: stable  Outcomes: patient tolerated procedure well    Additional Notes  Saphenous block performed with ultrasound guidance; 4\" stimuplex 21g needle used, 10cc 0.5% ropivacaine injected slowly with intermittent aspiration.  Medications Administered  BUPivacaine liposome (EXPAREL) injection 1.3% - Perineural   10 mL - 4/4/2024 10:48:00 AM

## 2024-04-04 NOTE — ANESTHESIA POSTPROCEDURE EVALUATION
Department of Anesthesiology  Postprocedure Note    Patient: Lola Gupta  MRN: [id number]  YOB: 1975  Date of evaluation: 4/4/2024    Procedure Summary       Date: 04/04/24 Room / Location: [facility] MAIN OR  / [facility] MAIN OR    Anesthesia Start: 1112 Anesthesia Stop: 1401    Procedure: OPEN REDUCTION INTERNAL FIXATION RIGHT TRIMALLEOLAR ANKLE FRACTURE WITH SYNDESMOSIS FIXATION (ANESTHESIA CHOICE, POPLITEAL BLOCK, SAPHENOUS BLOCK, PERIPHERAL NERVE CATHETER) (Right: Ankle) Diagnosis:       Closed trimalleolar fracture of right ankle, with routine healing, subsequent encounter      (Closed trimalleolar fracture of right ankle, with routine healing, subsequent encounter [O92.835P])    Surgeons: Lorenzo Cárdenas MD Responsible Provider: Artis Lacey MD    Anesthesia Type: General, Regional ASA Status: 2            Anesthesia Type: General, Regional    Carmelita Phase I: Carmelita Score: 8    Carmelita Phase II:      Anesthesia Post Evaluation    Patient location during evaluation: PACU  Patient participation: complete - patient participated  Level of consciousness: awake  Pain score: 0  Airway patency: patent  Nausea & Vomiting: no nausea and no vomiting  Cardiovascular status: blood pressure returned to baseline  Respiratory status: acceptable  Hydration status: euvolemic  Multimodal analgesia pain management approach  Pain management: adequate    No notable events documented.

## 2024-04-04 NOTE — OP NOTE
[facility]          [address] [place]  [address]                            OPERATIVE REPORT      PATIENT NAME: ALEXANDRU ROY               : 1975  MED REC NO: [id number]                       ROOM: OR  ACCOUNT NO: [id number]                       ADMIT DATE: 2024  PROVIDER: Lorenzo Cárdenas MD    DATE OF SERVICE:  2024    PREOPERATIVE DIAGNOSES:  Closed right trimalleolar ankle fracture.    POSTOPERATIVE DIAGNOSES:       1. Closed right trimalleolar ankle fracture.     2. Right syndesmosis disruption.    PROCEDURES PERFORMED:       1. Open reduction and internal fixation, right trimalleolar ankle fracture.     2. Open reduction and internal fixation, right syndesmosis.     3. Open Curettage microfracture of right medial talar dome osteochondral lesion     4. Independent interpretation of intraoperative fluoroscopy.    SURGEON:  Lorenzo Cárdenas MD    ASSISTANT:  Fernando Castillo PA-C.    ANESTHESIA:  Regional with general.    ESTIMATED BLOOD LOSS:  Less than 5 mL.    SPECIMENS REMOVED:  None.    INTRAOPERATIVE FINDINGS:  Closed right trimalleolar ankle fracture with right syndesmosis disruption.  After fixation of the medial and lateral malleoli as well as fixation of the syndesmosis, the posterior malleolus fracture was stable and well reduced and this posterior malleolus fracture involved less than 20% of the joint surface.  Medial talar dome osteochondral lesion approximately 3 mm x 2 mm.     COMPLICATIONS:  None.    IMPLANTS:  Arthrex medial hook plate and screws, Elizabeth lateral plate and screws, Arthrex TightRope x2.    INDICATIONS:  This is a 48-year-old female who suffered a right trimalleolar ankle fracture.  She also sprained her left ankle.  I offered both surgical and conservative management options to her.  I discussed the risks of surgery which include, but not limited to complications of anesthesia including death, pain, bleeding,

## 2024-04-04 NOTE — ANESTHESIA PRE PROCEDURE
Department of Anesthesiology  Preprocedure Note       Name:  Lola Gupta   Age:  48 y.o.  :  1975                                          MRN:  [id number]         Date:  2024      Surgeon: Surgeon(s):  Lorenzo Cárdenas MD    Procedure: Procedure(s):  OPEN REDUCTION INTERNAL FIXATION RIGHT TRIMALLEOLAR ANKLE FRACTURE WITH POSSIBLE SYNDESMOSIS FIXATION (ANESTHESIA CHOICE, POPLITEAL BLOCK, SAPHENOUS BLOCK, PERIPHERAL NERVE CATHETER)    Medications prior to admission:   Prior to Admission medications    Medication Sig Start Date End Date Taking? Authorizing Provider   amphetamine-dextroamphetamine (ADDERALL) 10 MG tablet TAKE 6 TABLETS BY MOUTH ONCE DAILY 24  Yes ProviderRavindra MD   WAKIX 17.8 MG TABS  3/11/24  Yes ProviderRavindra MD   olmesartan (BENICAR) 40 MG tablet Take 1 tablet by mouth daily 2/10/24  Yes Provider, MD Ravindra       Current medications:    Current Facility-Administered Medications   Medication Dose Route Frequency Provider Last Rate Last Admin    lidocaine PF 1 % injection 1 mL  1 mL IntraDERmal Once PRN Briana Cotton MD        fentaNYL (SUBLIMAZE) injection 100 mcg  100 mcg IntraVENous Once PRN Briana Cotton MD        lactated ringers IV soln infusion   IntraVENous Continuous Briana Cotton  mL/hr at 24 1017 New Bag at 24 1017    midazolam PF (VERSED) injection 2 mg  2 mg IntraVENous Once PRN Briana Cotton MD        ceFAZolin (ANCEF) 2,000 mg in sterile water 20 mL IV syringe  2,000 mg IntraVENous On Call to OR Lorenzo Cárdenas MD        pantoprazole (PROTONIX) tablet 40 mg  40 mg Oral QAM AC Daren Centeno PA   40 mg at 24 0617    sodium chloride flush 0.9 % injection 5-40 mL  5-40 mL IntraVENous 2 times per day Marilyn Edmondson APRN - NP   10 mL at 24 2112    sodium chloride flush 0.9 % injection 5-40 mL  5-40 mL IntraVENous PRN Marilyn Edmondson APRN - NP        0.9 % sodium chloride infusion   IntraVENous PRN

## 2024-04-04 NOTE — BRIEF OP NOTE
Brief Postoperative Note      Patient: Lola Gupta  YOB: 1975  MRN: [id number]    Date of Procedure: 4/4/2024    Pre-op Diagnosis  1. Closed RIGHT trimalleolar ankle fracture    Post-Op Diagnosis:   1. Closed RIGHT trimalleolar ankle fracture  2. RIGHT syndesmosis disruption       Procedure:  OPEN REDUCTION INTERNAL FIXATION RIGHT TRIMALLEOLAR ANKLE FRACTURE  OPEN REDUCTION INTERNAL FIXATION RIGHT SYNDESMOSIS  INDEPENDENT INTERPRETATION OF INTRA-OPERATIVE FLUOROSCOPY    Surgeon(s):  Lorenzo Cárdenas MD    Assistant:    During the procedure Fernando Castillo PA-C performed the duties of positioning, retraction, assistance with limb and implant management, closure and dressing application      Anesthesia: regional w/ general    Estimated Blood Loss (mL): less than 5cc    Complications: None    Specimens:   * No specimens in log *    Implants:  * No implants in log *      Arthrex medial hook plate/screws  Elizabeth lateral plate/screws  Arthrex tight rope x 2    Drains:   External Urinary Catheter (Active)   Site Assessment Clean,dry & intact 04/04/24 0919   Placement Repositioned 04/03/24 1200   Securement Method Securing device (Describe) 04/03/24 1200   Suction 40 mmgHg continuous 04/03/24 1200   Output (mL) 500 mL 04/04/24 0950       Findings:  Infection Present At Time Of Surgery (PATOS) (choose all levels that have infection present):  No infection present  Other Findings:   Closed RIGHT trimalleolar ankle fracture  RIGHT syndesmosis disruption    TT: hemaclear calf x 75 min    Electronically signed by Lorenzo Cárdenas MD on 4/4/2024 at 10:36 AM

## 2024-04-05 PROCEDURE — 97530 THERAPEUTIC ACTIVITIES: CPT

## 2024-04-05 PROCEDURE — L4360 PNEUMAT WALKING BOOT PRE CST: HCPCS

## 2024-04-05 PROCEDURE — 6370000000 HC RX 637 (ALT 250 FOR IP): Performed by: NURSE PRACTITIONER

## 2024-04-05 PROCEDURE — 6360000002 HC RX W HCPCS: Performed by: PHYSICIAN ASSISTANT

## 2024-04-05 PROCEDURE — 97161 PT EVAL LOW COMPLEX 20 MIN: CPT

## 2024-04-05 PROCEDURE — 2580000003 HC RX 258: Performed by: NURSE PRACTITIONER

## 2024-04-05 PROCEDURE — 6370000000 HC RX 637 (ALT 250 FOR IP): Performed by: PHYSICIAN ASSISTANT

## 2024-04-05 PROCEDURE — 1100000000 HC RM PRIVATE

## 2024-04-05 PROCEDURE — 94761 N-INVAS EAR/PLS OXIMETRY MLT: CPT

## 2024-04-05 PROCEDURE — 2580000003 HC RX 258: Performed by: PHYSICIAN ASSISTANT

## 2024-04-05 PROCEDURE — 97530 THERAPEUTIC ACTIVITIES: CPT | Performed by: OCCUPATIONAL THERAPIST

## 2024-04-05 PROCEDURE — 6360000002 HC RX W HCPCS: Performed by: ORTHOPAEDIC SURGERY

## 2024-04-05 PROCEDURE — 97535 SELF CARE MNGMENT TRAINING: CPT | Performed by: OCCUPATIONAL THERAPIST

## 2024-04-05 PROCEDURE — 6360000002 HC RX W HCPCS: Performed by: NURSE PRACTITIONER

## 2024-04-05 PROCEDURE — 97165 OT EVAL LOW COMPLEX 30 MIN: CPT | Performed by: OCCUPATIONAL THERAPIST

## 2024-04-05 PROCEDURE — 97116 GAIT TRAINING THERAPY: CPT

## 2024-04-05 RX ORDER — DEXTROAMPHETAMINE SACCHARATE, AMPHETAMINE ASPARTATE, DEXTROAMPHETAMINE SULFATE AND AMPHETAMINE SULFATE 2.5; 2.5; 2.5; 2.5 MG/1; MG/1; MG/1; MG/1
50 TABLET ORAL DAILY
Status: DISCONTINUED | OUTPATIENT
Start: 2024-04-05 | End: 2024-04-09 | Stop reason: HOSPADM

## 2024-04-05 RX ADMIN — OXYCODONE 10 MG: 5 TABLET ORAL at 09:20

## 2024-04-05 RX ADMIN — HYDROMORPHONE HYDROCHLORIDE 0.5 MG: 1 INJECTION, SOLUTION INTRAMUSCULAR; INTRAVENOUS; SUBCUTANEOUS at 21:30

## 2024-04-05 RX ADMIN — DEXTROAMPHETAMINE SACCHARATE, AMPHETAMINE ASPARTATE, DEXTROAMPHETAMINE SULFATE, AMPHETAMINE SULFATE TABLETS, 10 MG,CLL 20 MG: 2.5; 2.5; 2.5; 2.5 TABLET ORAL at 10:16

## 2024-04-05 RX ADMIN — ACETAMINOPHEN 650 MG: 325 TABLET ORAL at 09:21

## 2024-04-05 RX ADMIN — SODIUM CHLORIDE, PRESERVATIVE FREE 10 ML: 5 INJECTION INTRAVENOUS at 21:36

## 2024-04-05 RX ADMIN — HYDROMORPHONE HYDROCHLORIDE 0.5 MG: 1 INJECTION, SOLUTION INTRAMUSCULAR; INTRAVENOUS; SUBCUTANEOUS at 12:25

## 2024-04-05 RX ADMIN — POLYETHYLENE GLYCOL 3350 17 G: 17 POWDER, FOR SOLUTION ORAL at 09:22

## 2024-04-05 RX ADMIN — ACETAMINOPHEN 650 MG: 325 TABLET ORAL at 21:30

## 2024-04-05 RX ADMIN — ACETAMINOPHEN 650 MG: 325 TABLET ORAL at 16:03

## 2024-04-05 RX ADMIN — PANTOPRAZOLE SODIUM 40 MG: 40 TABLET, DELAYED RELEASE ORAL at 06:06

## 2024-04-05 RX ADMIN — OXYCODONE 10 MG: 5 TABLET ORAL at 17:10

## 2024-04-05 RX ADMIN — SERTRALINE HYDROCHLORIDE 50 MG: 50 TABLET ORAL at 09:21

## 2024-04-05 RX ADMIN — ENOXAPARIN SODIUM 30 MG: 100 INJECTION SUBCUTANEOUS at 09:21

## 2024-04-05 RX ADMIN — HYDROMORPHONE HYDROCHLORIDE 0.5 MG: 1 INJECTION, SOLUTION INTRAMUSCULAR; INTRAVENOUS; SUBCUTANEOUS at 04:09

## 2024-04-05 RX ADMIN — ACETAMINOPHEN 650 MG: 325 TABLET ORAL at 04:09

## 2024-04-05 RX ADMIN — ENOXAPARIN SODIUM 30 MG: 100 INJECTION SUBCUTANEOUS at 21:30

## 2024-04-05 RX ADMIN — CEFAZOLIN 2000 MG: 2 INJECTION, POWDER, FOR SOLUTION INTRAMUSCULAR; INTRAVENOUS at 04:13

## 2024-04-05 RX ADMIN — HYDROMORPHONE HYDROCHLORIDE 0.5 MG: 1 INJECTION, SOLUTION INTRAMUSCULAR; INTRAVENOUS; SUBCUTANEOUS at 16:01

## 2024-04-05 RX ADMIN — LOSARTAN POTASSIUM 100 MG: 50 TABLET, FILM COATED ORAL at 09:19

## 2024-04-05 ASSESSMENT — PAIN DESCRIPTION - ORIENTATION
ORIENTATION: RIGHT
ORIENTATION: RIGHT
ORIENTATION: RIGHT;LEFT
ORIENTATION: RIGHT
ORIENTATION: RIGHT
ORIENTATION: LEFT

## 2024-04-05 ASSESSMENT — PAIN SCALES - GENERAL
PAINLEVEL_OUTOF10: 10
PAINLEVEL_OUTOF10: 9
PAINLEVEL_OUTOF10: 10

## 2024-04-05 ASSESSMENT — PAIN DESCRIPTION - LOCATION
LOCATION: LEG
LOCATION: ANKLE

## 2024-04-06 PROCEDURE — 97530 THERAPEUTIC ACTIVITIES: CPT

## 2024-04-06 PROCEDURE — 6370000000 HC RX 637 (ALT 250 FOR IP): Performed by: NURSE PRACTITIONER

## 2024-04-06 PROCEDURE — 6360000002 HC RX W HCPCS: Performed by: ORTHOPAEDIC SURGERY

## 2024-04-06 PROCEDURE — 6360000002 HC RX W HCPCS: Performed by: NURSE PRACTITIONER

## 2024-04-06 PROCEDURE — 2580000003 HC RX 258: Performed by: PHYSICIAN ASSISTANT

## 2024-04-06 PROCEDURE — 1100000000 HC RM PRIVATE

## 2024-04-06 PROCEDURE — 97110 THERAPEUTIC EXERCISES: CPT

## 2024-04-06 PROCEDURE — 2580000003 HC RX 258: Performed by: NURSE PRACTITIONER

## 2024-04-06 PROCEDURE — 6370000000 HC RX 637 (ALT 250 FOR IP): Performed by: PHYSICIAN ASSISTANT

## 2024-04-06 RX ADMIN — PANTOPRAZOLE SODIUM 40 MG: 40 TABLET, DELAYED RELEASE ORAL at 07:40

## 2024-04-06 RX ADMIN — HYDROMORPHONE HYDROCHLORIDE 0.5 MG: 1 INJECTION, SOLUTION INTRAMUSCULAR; INTRAVENOUS; SUBCUTANEOUS at 10:51

## 2024-04-06 RX ADMIN — POLYETHYLENE GLYCOL 3350 17 G: 17 POWDER, FOR SOLUTION ORAL at 07:39

## 2024-04-06 RX ADMIN — SODIUM CHLORIDE, PRESERVATIVE FREE 10 ML: 5 INJECTION INTRAVENOUS at 07:40

## 2024-04-06 RX ADMIN — ACETAMINOPHEN 650 MG: 325 TABLET ORAL at 10:51

## 2024-04-06 RX ADMIN — ACETAMINOPHEN 650 MG: 325 TABLET ORAL at 22:19

## 2024-04-06 RX ADMIN — SODIUM CHLORIDE, PRESERVATIVE FREE 10 ML: 5 INJECTION INTRAVENOUS at 22:21

## 2024-04-06 RX ADMIN — LOSARTAN POTASSIUM 100 MG: 50 TABLET, FILM COATED ORAL at 07:39

## 2024-04-06 RX ADMIN — HYDROMORPHONE HYDROCHLORIDE 0.5 MG: 1 INJECTION, SOLUTION INTRAMUSCULAR; INTRAVENOUS; SUBCUTANEOUS at 19:50

## 2024-04-06 RX ADMIN — OXYCODONE 10 MG: 5 TABLET ORAL at 17:44

## 2024-04-06 RX ADMIN — DEXTROAMPHETAMINE SACCHARATE, AMPHETAMINE ASPARTATE, DEXTROAMPHETAMINE SULFATE, AMPHETAMINE SULFATE TABLETS, 10 MG,CLL 50 MG: 2.5; 2.5; 2.5; 2.5 TABLET ORAL at 07:40

## 2024-04-06 RX ADMIN — OXYCODONE 10 MG: 5 TABLET ORAL at 22:19

## 2024-04-06 RX ADMIN — ACETAMINOPHEN 650 MG: 325 TABLET ORAL at 04:38

## 2024-04-06 RX ADMIN — SODIUM CHLORIDE, PRESERVATIVE FREE 10 ML: 5 INJECTION INTRAVENOUS at 07:41

## 2024-04-06 RX ADMIN — HYDROMORPHONE HYDROCHLORIDE 0.5 MG: 1 INJECTION, SOLUTION INTRAMUSCULAR; INTRAVENOUS; SUBCUTANEOUS at 07:25

## 2024-04-06 RX ADMIN — OXYCODONE 10 MG: 5 TABLET ORAL at 04:38

## 2024-04-06 RX ADMIN — OXYCODONE 10 MG: 5 TABLET ORAL at 13:09

## 2024-04-06 RX ADMIN — ACETAMINOPHEN 650 MG: 325 TABLET ORAL at 17:39

## 2024-04-06 RX ADMIN — SERTRALINE HYDROCHLORIDE 50 MG: 50 TABLET ORAL at 07:40

## 2024-04-06 RX ADMIN — HYDROMORPHONE HYDROCHLORIDE 0.5 MG: 1 INJECTION, SOLUTION INTRAMUSCULAR; INTRAVENOUS; SUBCUTANEOUS at 14:28

## 2024-04-06 RX ADMIN — ENOXAPARIN SODIUM 30 MG: 100 INJECTION SUBCUTANEOUS at 07:40

## 2024-04-06 ASSESSMENT — PAIN SCALES - GENERAL
PAINLEVEL_OUTOF10: 10
PAINLEVEL_OUTOF10: 8
PAINLEVEL_OUTOF10: 7
PAINLEVEL_OUTOF10: 3
PAINLEVEL_OUTOF10: 10
PAINLEVEL_OUTOF10: 10
PAINLEVEL_OUTOF10: 9

## 2024-04-06 ASSESSMENT — PAIN DESCRIPTION - DESCRIPTORS
DESCRIPTORS: THROBBING

## 2024-04-06 ASSESSMENT — PAIN DESCRIPTION - LOCATION
LOCATION: LEG;FOOT
LOCATION: LEG
LOCATION: FOOT

## 2024-04-06 ASSESSMENT — PAIN DESCRIPTION - ORIENTATION
ORIENTATION: RIGHT

## 2024-04-07 PROCEDURE — 2580000003 HC RX 258: Performed by: NURSE PRACTITIONER

## 2024-04-07 PROCEDURE — 6360000002 HC RX W HCPCS: Performed by: NURSE PRACTITIONER

## 2024-04-07 PROCEDURE — 6370000000 HC RX 637 (ALT 250 FOR IP): Performed by: PHYSICIAN ASSISTANT

## 2024-04-07 PROCEDURE — 6360000002 HC RX W HCPCS: Performed by: ORTHOPAEDIC SURGERY

## 2024-04-07 PROCEDURE — 1100000000 HC RM PRIVATE

## 2024-04-07 PROCEDURE — 97116 GAIT TRAINING THERAPY: CPT

## 2024-04-07 PROCEDURE — 6370000000 HC RX 637 (ALT 250 FOR IP): Performed by: NURSE PRACTITIONER

## 2024-04-07 PROCEDURE — 2580000003 HC RX 258: Performed by: PHYSICIAN ASSISTANT

## 2024-04-07 PROCEDURE — 94761 N-INVAS EAR/PLS OXIMETRY MLT: CPT

## 2024-04-07 PROCEDURE — 97110 THERAPEUTIC EXERCISES: CPT

## 2024-04-07 RX ADMIN — SODIUM CHLORIDE, PRESERVATIVE FREE 10 ML: 5 INJECTION INTRAVENOUS at 20:46

## 2024-04-07 RX ADMIN — ENOXAPARIN SODIUM 30 MG: 100 INJECTION SUBCUTANEOUS at 20:50

## 2024-04-07 RX ADMIN — POLYETHYLENE GLYCOL 3350 17 G: 17 POWDER, FOR SOLUTION ORAL at 08:01

## 2024-04-07 RX ADMIN — HYDROMORPHONE HYDROCHLORIDE 0.5 MG: 1 INJECTION, SOLUTION INTRAMUSCULAR; INTRAVENOUS; SUBCUTANEOUS at 20:39

## 2024-04-07 RX ADMIN — OXYCODONE 10 MG: 5 TABLET ORAL at 17:19

## 2024-04-07 RX ADMIN — OXYCODONE 10 MG: 5 TABLET ORAL at 23:14

## 2024-04-07 RX ADMIN — OXYCODONE 10 MG: 5 TABLET ORAL at 06:22

## 2024-04-07 RX ADMIN — PANTOPRAZOLE SODIUM 40 MG: 40 TABLET, DELAYED RELEASE ORAL at 05:16

## 2024-04-07 RX ADMIN — SERTRALINE HYDROCHLORIDE 50 MG: 50 TABLET ORAL at 08:01

## 2024-04-07 RX ADMIN — ACETAMINOPHEN 650 MG: 325 TABLET ORAL at 05:16

## 2024-04-07 RX ADMIN — SODIUM CHLORIDE, PRESERVATIVE FREE 10 ML: 5 INJECTION INTRAVENOUS at 08:02

## 2024-04-07 RX ADMIN — ACETAMINOPHEN 650 MG: 325 TABLET ORAL at 20:50

## 2024-04-07 RX ADMIN — SODIUM CHLORIDE, PRESERVATIVE FREE 10 ML: 5 INJECTION INTRAVENOUS at 20:45

## 2024-04-07 RX ADMIN — ENOXAPARIN SODIUM 30 MG: 100 INJECTION SUBCUTANEOUS at 08:01

## 2024-04-07 RX ADMIN — HYDROMORPHONE HYDROCHLORIDE 0.5 MG: 1 INJECTION, SOLUTION INTRAMUSCULAR; INTRAVENOUS; SUBCUTANEOUS at 15:16

## 2024-04-07 RX ADMIN — LOSARTAN POTASSIUM 100 MG: 50 TABLET, FILM COATED ORAL at 08:01

## 2024-04-07 RX ADMIN — HYDROMORPHONE HYDROCHLORIDE 0.5 MG: 1 INJECTION, SOLUTION INTRAMUSCULAR; INTRAVENOUS; SUBCUTANEOUS at 09:26

## 2024-04-07 RX ADMIN — ACETAMINOPHEN 650 MG: 325 TABLET ORAL at 09:26

## 2024-04-07 RX ADMIN — HYDROMORPHONE HYDROCHLORIDE 0.5 MG: 1 INJECTION, SOLUTION INTRAMUSCULAR; INTRAVENOUS; SUBCUTANEOUS at 12:54

## 2024-04-07 RX ADMIN — DEXTROAMPHETAMINE SACCHARATE, AMPHETAMINE ASPARTATE, DEXTROAMPHETAMINE SULFATE, AMPHETAMINE SULFATE TABLETS, 10 MG,CLL 50 MG: 2.5; 2.5; 2.5; 2.5 TABLET ORAL at 08:01

## 2024-04-07 RX ADMIN — ACETAMINOPHEN 650 MG: 325 TABLET ORAL at 15:15

## 2024-04-07 ASSESSMENT — PAIN DESCRIPTION - LOCATION
LOCATION: FOOT
LOCATION: ANKLE;FOOT
LOCATION: FOOT;ANKLE
LOCATION: FOOT
LOCATION: ANKLE;FOOT

## 2024-04-07 ASSESSMENT — PAIN SCALES - GENERAL
PAINLEVEL_OUTOF10: 8
PAINLEVEL_OUTOF10: 8
PAINLEVEL_OUTOF10: 9
PAINLEVEL_OUTOF10: 4
PAINLEVEL_OUTOF10: 8
PAINLEVEL_OUTOF10: 9
PAINLEVEL_OUTOF10: 9
PAINLEVEL_OUTOF10: 0
PAINLEVEL_OUTOF10: 9

## 2024-04-07 ASSESSMENT — PAIN DESCRIPTION - ORIENTATION
ORIENTATION: LEFT;RIGHT
ORIENTATION: RIGHT
ORIENTATION: RIGHT;LEFT
ORIENTATION: RIGHT
ORIENTATION: RIGHT

## 2024-04-07 ASSESSMENT — PAIN DESCRIPTION - DESCRIPTORS
DESCRIPTORS: THROBBING

## 2024-04-07 ASSESSMENT — PAIN SCALES - WONG BAKER: WONGBAKER_NUMERICALRESPONSE: HURTS LITTLE MORE

## 2024-04-08 PROCEDURE — 6370000000 HC RX 637 (ALT 250 FOR IP): Performed by: PHYSICIAN ASSISTANT

## 2024-04-08 PROCEDURE — APPNB45 APP NON BILLABLE 31-45 MINUTES: Performed by: PHYSICIAN ASSISTANT

## 2024-04-08 PROCEDURE — 2580000003 HC RX 258: Performed by: PHYSICIAN ASSISTANT

## 2024-04-08 PROCEDURE — 6370000000 HC RX 637 (ALT 250 FOR IP): Performed by: NURSE PRACTITIONER

## 2024-04-08 PROCEDURE — 97116 GAIT TRAINING THERAPY: CPT

## 2024-04-08 PROCEDURE — 94761 N-INVAS EAR/PLS OXIMETRY MLT: CPT

## 2024-04-08 PROCEDURE — 1100000000 HC RM PRIVATE

## 2024-04-08 PROCEDURE — 6360000002 HC RX W HCPCS: Performed by: ORTHOPAEDIC SURGERY

## 2024-04-08 PROCEDURE — 97110 THERAPEUTIC EXERCISES: CPT

## 2024-04-08 PROCEDURE — 6360000002 HC RX W HCPCS: Performed by: NURSE PRACTITIONER

## 2024-04-08 RX ADMIN — SODIUM CHLORIDE, PRESERVATIVE FREE 10 ML: 5 INJECTION INTRAVENOUS at 09:11

## 2024-04-08 RX ADMIN — OXYCODONE 10 MG: 5 TABLET ORAL at 23:41

## 2024-04-08 RX ADMIN — ENOXAPARIN SODIUM 30 MG: 100 INJECTION SUBCUTANEOUS at 09:11

## 2024-04-08 RX ADMIN — ACETAMINOPHEN 650 MG: 325 TABLET ORAL at 03:36

## 2024-04-08 RX ADMIN — SERTRALINE HYDROCHLORIDE 50 MG: 50 TABLET ORAL at 09:10

## 2024-04-08 RX ADMIN — OXYCODONE 10 MG: 5 TABLET ORAL at 09:10

## 2024-04-08 RX ADMIN — DEXTROAMPHETAMINE SACCHARATE, AMPHETAMINE ASPARTATE, DEXTROAMPHETAMINE SULFATE, AMPHETAMINE SULFATE TABLETS, 10 MG,CLL 50 MG: 2.5; 2.5; 2.5; 2.5 TABLET ORAL at 09:10

## 2024-04-08 RX ADMIN — POLYETHYLENE GLYCOL 3350 17 G: 17 POWDER, FOR SOLUTION ORAL at 09:11

## 2024-04-08 RX ADMIN — ENOXAPARIN SODIUM 30 MG: 100 INJECTION SUBCUTANEOUS at 23:42

## 2024-04-08 RX ADMIN — SODIUM CHLORIDE, PRESERVATIVE FREE 10 ML: 5 INJECTION INTRAVENOUS at 23:44

## 2024-04-08 RX ADMIN — ACETAMINOPHEN 650 MG: 325 TABLET ORAL at 09:10

## 2024-04-08 RX ADMIN — OXYCODONE 10 MG: 5 TABLET ORAL at 15:03

## 2024-04-08 RX ADMIN — HYDROMORPHONE HYDROCHLORIDE 0.5 MG: 1 INJECTION, SOLUTION INTRAMUSCULAR; INTRAVENOUS; SUBCUTANEOUS at 03:32

## 2024-04-08 RX ADMIN — OXYCODONE 10 MG: 5 TABLET ORAL at 05:34

## 2024-04-08 RX ADMIN — LOSARTAN POTASSIUM 100 MG: 50 TABLET, FILM COATED ORAL at 09:10

## 2024-04-08 RX ADMIN — PANTOPRAZOLE SODIUM 40 MG: 40 TABLET, DELAYED RELEASE ORAL at 05:34

## 2024-04-08 RX ADMIN — ACETAMINOPHEN 650 MG: 325 TABLET ORAL at 15:03

## 2024-04-08 RX ADMIN — ACETAMINOPHEN 650 MG: 325 TABLET ORAL at 22:00

## 2024-04-08 ASSESSMENT — PAIN DESCRIPTION - LOCATION
LOCATION: FOOT
LOCATION: LEG
LOCATION: ANKLE
LOCATION: FOOT
LOCATION: ANKLE

## 2024-04-08 ASSESSMENT — PAIN DESCRIPTION - ORIENTATION
ORIENTATION: RIGHT
ORIENTATION: RIGHT;LEFT

## 2024-04-08 ASSESSMENT — PAIN DESCRIPTION - DESCRIPTORS
DESCRIPTORS: ACHING;SORE
DESCRIPTORS: THROBBING
DESCRIPTORS: ACHING

## 2024-04-08 ASSESSMENT — PAIN SCALES - GENERAL
PAINLEVEL_OUTOF10: 10
PAINLEVEL_OUTOF10: 9
PAINLEVEL_OUTOF10: 9
PAINLEVEL_OUTOF10: 6
PAINLEVEL_OUTOF10: 0
PAINLEVEL_OUTOF10: 4
PAINLEVEL_OUTOF10: 0
PAINLEVEL_OUTOF10: 4
PAINLEVEL_OUTOF10: 8

## 2024-04-08 NOTE — CARDIO/PULMONARY
4/8/2024 4:24 PM [facility] has received approval for pt's admission and will have a bed available tomorrow after 2PM.   CM updated pt and pt's .   Discussed transport at discharge, pt's  will transport pt to [facility].      4/8/2024  3:46 PM CM updated pt and pt's , auth remains pending for [facility].   CM will follow.     4/8/2024 1:50 PM CM received call from [place] with [facility], auth remains pending.     4/8/2024  10:13 AM   Care Management Progress Note    Admitting diagnosis:  Closed trimalleolar fracture of right ankle, initial encounter [S82.851A]  Fall, initial encounter [W19.XXXA]  Closed right trimalleolar fracture, initial encounter [S82.851A]  Sprain of left ankle, unspecified ligament, initial encounter [S93.402A]    Admit Date:  4/2/2024 12:41 PM    RUR:  11%  Risk Level: [x]Low []Moderate []High    Transition of care plan:  Ongoing management by Ortho  PT and OT consulted: Yes  Anticipated discharge plan: [facility] has accepted and auth approved for admission on 4/9  Outpatient follow-up.  Discharge transport: Pt's

## 2024-04-09 VITALS
TEMPERATURE: 98.6 F | BODY MASS INDEX: 35.31 KG/M2 | RESPIRATION RATE: 20 BRPM | WEIGHT: 225 LBS | HEART RATE: 99 BPM | OXYGEN SATURATION: 93 % | SYSTOLIC BLOOD PRESSURE: 138 MMHG | DIASTOLIC BLOOD PRESSURE: 99 MMHG | HEIGHT: 67 IN

## 2024-04-09 PROCEDURE — 2580000003 HC RX 258: Performed by: NURSE PRACTITIONER

## 2024-04-09 PROCEDURE — 6370000000 HC RX 637 (ALT 250 FOR IP): Performed by: PHYSICIAN ASSISTANT

## 2024-04-09 PROCEDURE — 97535 SELF CARE MNGMENT TRAINING: CPT

## 2024-04-09 PROCEDURE — 99024 POSTOP FOLLOW-UP VISIT: CPT | Performed by: PHYSICIAN ASSISTANT

## 2024-04-09 PROCEDURE — 2580000003 HC RX 258: Performed by: PHYSICIAN ASSISTANT

## 2024-04-09 PROCEDURE — 94761 N-INVAS EAR/PLS OXIMETRY MLT: CPT

## 2024-04-09 PROCEDURE — 6360000002 HC RX W HCPCS: Performed by: ORTHOPAEDIC SURGERY

## 2024-04-09 PROCEDURE — 6370000000 HC RX 637 (ALT 250 FOR IP): Performed by: NURSE PRACTITIONER

## 2024-04-09 RX ORDER — OXYCODONE HYDROCHLORIDE 5 MG/1
5 TABLET ORAL EVERY 4 HOURS PRN
Qty: 30 TABLET | Refills: 0 | Status: SHIPPED
Start: 2024-04-09 | End: 2024-04-14

## 2024-04-09 RX ORDER — ENOXAPARIN SODIUM 100 MG/ML
40 INJECTION SUBCUTANEOUS 2 TIMES DAILY
Qty: 30 EACH | Refills: 0 | Status: SHIPPED
Start: 2024-04-09

## 2024-04-09 RX ADMIN — SODIUM CHLORIDE, PRESERVATIVE FREE 10 ML: 5 INJECTION INTRAVENOUS at 08:37

## 2024-04-09 RX ADMIN — SERTRALINE HYDROCHLORIDE 50 MG: 50 TABLET ORAL at 08:35

## 2024-04-09 RX ADMIN — ENOXAPARIN SODIUM 30 MG: 100 INJECTION SUBCUTANEOUS at 08:36

## 2024-04-09 RX ADMIN — OXYCODONE 10 MG: 5 TABLET ORAL at 08:35

## 2024-04-09 RX ADMIN — PANTOPRAZOLE SODIUM 40 MG: 40 TABLET, DELAYED RELEASE ORAL at 06:37

## 2024-04-09 RX ADMIN — LOSARTAN POTASSIUM 100 MG: 50 TABLET, FILM COATED ORAL at 08:35

## 2024-04-09 RX ADMIN — SODIUM CHLORIDE, PRESERVATIVE FREE 10 ML: 5 INJECTION INTRAVENOUS at 02:04

## 2024-04-09 RX ADMIN — ACETAMINOPHEN 650 MG: 325 TABLET ORAL at 08:36

## 2024-04-09 RX ADMIN — DEXTROAMPHETAMINE SACCHARATE, AMPHETAMINE ASPARTATE, DEXTROAMPHETAMINE SULFATE, AMPHETAMINE SULFATE TABLETS, 10 MG,CLL 50 MG: 2.5; 2.5; 2.5; 2.5 TABLET ORAL at 08:35

## 2024-04-09 RX ADMIN — POLYETHYLENE GLYCOL 3350 17 G: 17 POWDER, FOR SOLUTION ORAL at 08:37

## 2024-04-09 ASSESSMENT — PAIN SCALES - GENERAL: PAINLEVEL_OUTOF10: 8

## 2024-04-09 ASSESSMENT — PAIN DESCRIPTION - LOCATION: LOCATION: ANKLE

## 2024-04-09 ASSESSMENT — PAIN DESCRIPTION - ORIENTATION: ORIENTATION: RIGHT;LEFT

## 2024-04-09 ASSESSMENT — PAIN DESCRIPTION - DESCRIPTORS: DESCRIPTORS: ACHING;SORE

## 2024-04-09 NOTE — PLAN OF CARE
Problem: Discharge Planning  Goal: Discharge to home or other facility with appropriate resources  4/7/2024 0920 by Laurita Bishop RN  Outcome: Progressing  4/6/2024 2121 by Nikki Mendes RN  Outcome: Progressing     Problem: Pain  Goal: Verbalizes/displays adequate comfort level or baseline comfort level  4/7/2024 0920 by Laurita Bishop RN  Outcome: Progressing  4/6/2024 2121 by Nikki Mendes RN  Outcome: Progressing  Flowsheets (Taken 4/6/2024 1950)  Verbalizes/displays adequate comfort level or baseline comfort level: Administer analgesics based on type and severity of pain and evaluate response     Problem: Skin/Tissue Integrity  Goal: Absence of new skin breakdown  Description: 1.  Monitor for areas of redness and/or skin breakdown  2.  Assess vascular access sites hourly  3.  Every 4-6 hours minimum:  Change oxygen saturation probe site  4.  Every 4-6 hours:  If on nasal continuous positive airway pressure, respiratory therapy assess nares and determine need for appliance change or resting period.  4/7/2024 0920 by Laurita Bishop RN  Outcome: Progressing  4/6/2024 2121 by Nikki Mendes RN  Outcome: Progressing     Problem: Safety - Adult  Goal: Free from fall injury  4/7/2024 0920 by Laurita Bishop RN  Outcome: Progressing  4/6/2024 2121 by Nikki Mendes RN  Outcome: Progressing     Problem: ABCDS Injury Assessment  Goal: Absence of physical injury  4/7/2024 0920 by Laurita Bishop RN  Outcome: Progressing  4/6/2024 2121 by Nikki Mendes RN  Outcome: Progressing     
  Problem: Discharge Planning  Goal: Discharge to home or other facility with appropriate resources  Outcome: Progressing     Problem: Pain  Goal: Verbalizes/displays adequate comfort level or baseline comfort level  Outcome: Progressing     
  Problem: Discharge Planning  Goal: Discharge to home or other facility with appropriate resources  Outcome: Progressing     Problem: Pain  Goal: Verbalizes/displays adequate comfort level or baseline comfort level  Outcome: Progressing     Problem: Skin/Tissue Integrity  Goal: Absence of new skin breakdown  Description: 1.  Monitor for areas of redness and/or skin breakdown  2.  Assess vascular access sites hourly  3.  Every 4-6 hours minimum:  Change oxygen saturation probe site  4.  Every 4-6 hours:  If on nasal continuous positive airway pressure, respiratory therapy assess nares and determine need for appliance change or resting period.  Outcome: Progressing     Problem: Safety - Adult  Goal: Free from fall injury  Outcome: Progressing     Problem: ABCDS Injury Assessment  Goal: Absence of physical injury  Outcome: Progressing     Problem: Physical Therapy - Adult  Goal: By Discharge: Performs mobility at highest level of function for planned discharge setting.  See evaluation for individualized goals.  Description: FUNCTIONAL STATUS PRIOR TO ADMISSION: Patient was independent and active without use of DME.    HOME SUPPORT PRIOR TO ADMISSION: The patient lived with family but did not require assistance.    Physical Therapy Goals  Initiated 4/5/2024  1.  Patient will move from supine to sit and sit to supine, scoot up and down, and roll side to side in bed with modified independence within 7 day(s).    2.  Patient will perform sit to stand with modified independence within 7 day(s).  3.  Patient will transfer from bed to chair and chair to bed with modified independence using the least restrictive device within 7 day(s).  4.  Patient will ambulate with modified independence for 100 feet with the least restrictive device within 7 day(s).   5.  Patient will ascend/descend 7 stairs with  handrail(s) with supervision/set-up within 7 day(s).   4/7/2024 1233 by Margarita Colby, PT  Outcome: Progressing     
  Problem: Discharge Planning  Goal: Discharge to home or other facility with appropriate resources  Outcome: Progressing     Problem: Pain  Goal: Verbalizes/displays adequate comfort level or baseline comfort level  Outcome: Progressing  Flowsheets  Taken 4/4/2024 2109  Verbalizes/displays adequate comfort level or baseline comfort level: Administer analgesics based on type and severity of pain and evaluate response  Taken 4/4/2024 2004  Verbalizes/displays adequate comfort level or baseline comfort level: Administer analgesics based on type and severity of pain and evaluate response     Problem: Skin/Tissue Integrity  Goal: Absence of new skin breakdown  Description: 1.  Monitor for areas of redness and/or skin breakdown  2.  Assess vascular access sites hourly  3.  Every 4-6 hours minimum:  Change oxygen saturation probe site  4.  Every 4-6 hours:  If on nasal continuous positive airway pressure, respiratory therapy assess nares and determine need for appliance change or resting period.  Outcome: Progressing     Problem: Safety - Adult  Goal: Free from fall injury  Outcome: Progressing     Problem: ABCDS Injury Assessment  Goal: Absence of physical injury  Outcome: Progressing     
  Problem: Discharge Planning  Goal: Discharge to home or other facility with appropriate resources  Outcome: Progressing     Problem: Pain  Goal: Verbalizes/displays adequate comfort level or baseline comfort level  Outcome: Progressing  Flowsheets (Taken 4/5/2024 2130)  Verbalizes/displays adequate comfort level or baseline comfort level: Administer analgesics based on type and severity of pain and evaluate response     Problem: Skin/Tissue Integrity  Goal: Absence of new skin breakdown  Description: 1.  Monitor for areas of redness and/or skin breakdown  2.  Assess vascular access sites hourly  3.  Every 4-6 hours minimum:  Change oxygen saturation probe site  4.  Every 4-6 hours:  If on nasal continuous positive airway pressure, respiratory therapy assess nares and determine need for appliance change or resting period.  Outcome: Progressing     Problem: Safety - Adult  Goal: Free from fall injury  Outcome: Progressing     Problem: ABCDS Injury Assessment  Goal: Absence of physical injury  Outcome: Progressing     Problem: Physical Therapy - Adult  Goal: By Discharge: Performs mobility at highest level of function for planned discharge setting.  See evaluation for individualized goals.  Description: FUNCTIONAL STATUS PRIOR TO ADMISSION: Patient was independent and active without use of DME.    HOME SUPPORT PRIOR TO ADMISSION: The patient lived with family but did not require assistance.    Physical Therapy Goals  Initiated 4/5/2024  1.  Patient will move from supine to sit and sit to supine, scoot up and down, and roll side to side in bed with modified independence within 7 day(s).    2.  Patient will perform sit to stand with modified independence within 7 day(s).  3.  Patient will transfer from bed to chair and chair to bed with modified independence using the least restrictive device within 7 day(s).  4.  Patient will ambulate with modified independence for 100 feet with the least restrictive device within 7 
  Problem: Occupational Therapy - Adult  Goal: By Discharge: Performs self-care activities at highest level of function for planned discharge setting.  See evaluation for individualized goals.  Description: FUNCTIONAL STATUS PRIOR TO ADMISSION:  Independent, active, drives.   ADL Assistance: Independent,  Homemaking Assistance: Independent, Ambulation Assistance: Independent, Transfer Assistance: Independent, Active : Yes     HOME SUPPORT: Patient lived with  and family but didn't require assistance.    Occupational Therapy Goals:  Initiated 4/5/2024  1.  Patient will perform lower body dressing with Contact Guard Assist within 7 day(s).  2.  Patient will perform toilet transfers with Contact Guard Assist  within 7 day(s).  3.  Patient will perform all aspects of toileting with Contact Guard Assist within 7 day(s).  4.  Patient will participate in upper extremity therapeutic exercise/activities with [place] for 10 minutes within 7 day(s).    5.  Patient will utilize energy conservation techniques during functional activities with verbal and visual cues within 7 day(s).   Outcome: Progressing   OCCUPATIONAL THERAPY TREATMENT  Patient: Lola Gupta (48 y.o. female)  Date: 4/6/2024  Primary Diagnosis: Closed trimalleolar fracture of right ankle, initial encounter [S82.851A]  Fall, initial encounter [W19.XXXA]  Closed right trimalleolar fracture, initial encounter [S82.851A]  Sprain of left ankle, unspecified ligament, initial encounter [S93.402A]  Procedure(s) (LRB):  OPEN REDUCTION INTERNAL FIXATION RIGHT TRIMALLEOLAR ANKLE FRACTURE WITH SYNDESMOSIS FIXATION (ANESTHESIA CHOICE, POPLITEAL BLOCK, SAPHENOUS BLOCK, PERIPHERAL NERVE CATHETER) (Right) 2 Days Post-Op   Precautions: Weight Bearing, Fall Risk Right Lower Extremity Weight Bearing: Non Weight Bearing Left Lower Extremity Weight Bearing: Weight Bearing As Tolerated (with CAM Boot)            Chart, occupational therapy assessment, plan of 
  Problem: Occupational Therapy - Adult  Goal: By Discharge: Performs self-care activities at highest level of function for planned discharge setting.  See evaluation for individualized goals.  Description: FUNCTIONAL STATUS PRIOR TO ADMISSION:  Independent, active, drives.   ADL Assistance: Independent,  Homemaking Assistance: Independent, Ambulation Assistance: Independent, Transfer Assistance: Independent, Active : Yes     HOME SUPPORT: Patient lived with  and family but didn't require assistance.    Occupational Therapy Goals:  Initiated 4/5/2024  1.  Patient will perform lower body dressing with Contact Guard Assist within 7 day(s).  2.  Patient will perform toilet transfers with Contact Guard Assist  within 7 day(s).  3.  Patient will perform all aspects of toileting with Contact Guard Assist within 7 day(s).  4.  Patient will participate in upper extremity therapeutic exercise/activities with [place] for 10 minutes within 7 day(s).    5.  Patient will utilize energy conservation techniques during functional activities with verbal and visual cues within 7 day(s).   Outcome: Progressing     OCCUPATIONAL THERAPY EVALUATION    Patient: Lola Gupta (48 y.o. female)  Date: 4/5/2024  Primary Diagnosis: Closed trimalleolar fracture of right ankle, initial encounter [S82.851A]  Fall, initial encounter [W19.XXXA]  Closed right trimalleolar fracture, initial encounter [S82.851A]  Sprain of left ankle, unspecified ligament, initial encounter [S93.402A]  Procedure(s) (LRB):  OPEN REDUCTION INTERNAL FIXATION RIGHT TRIMALLEOLAR ANKLE FRACTURE WITH SYNDESMOSIS FIXATION (ANESTHESIA CHOICE, POPLITEAL BLOCK, SAPHENOUS BLOCK, PERIPHERAL NERVE CATHETER) (Right) 1 Day Post-Op     Precautions: Weight Bearing, Fall Risk Right Lower Extremity Weight Bearing: Non Weight Bearing Left Lower Extremity Weight Bearing: Weight Bearing As Tolerated (with CAM Boot)              ASSESSMENT :  The patient is limited by 
  Problem: Physical Therapy - Adult  Goal: By Discharge: Performs mobility at highest level of function for planned discharge setting.  See evaluation for individualized goals.  Description: FUNCTIONAL STATUS PRIOR TO ADMISSION: Patient was independent and active without use of DME.    HOME SUPPORT PRIOR TO ADMISSION: The patient lived with family but did not require assistance.    Physical Therapy Goals  Initiated 4/5/2024  1.  Patient will move from supine to sit and sit to supine, scoot up and down, and roll side to side in bed with modified independence within 7 day(s).    2.  Patient will perform sit to stand with modified independence within 7 day(s).  3.  Patient will transfer from bed to chair and chair to bed with modified independence using the least restrictive device within 7 day(s).  4.  Patient will ambulate with modified independence for 100 feet with the least restrictive device within 7 day(s).   5.  Patient will ascend/descend 7 stairs with  handrail(s) with supervision/set-up within 7 day(s).   Outcome: Progressing   PHYSICAL THERAPY TREATMENT    Patient: Lola Gupta (48 y.o. female)  Date: 4/6/2024  Diagnosis: Closed trimalleolar fracture of right ankle, initial encounter [S82.851A]  Fall, initial encounter [W19.XXXA]  Closed right trimalleolar fracture, initial encounter [S82.851A]  Sprain of left ankle, unspecified ligament, initial encounter [S93.402A] Closed right trimalleolar fracture  Procedure(s) (LRB):  OPEN REDUCTION INTERNAL FIXATION RIGHT TRIMALLEOLAR ANKLE FRACTURE WITH SYNDESMOSIS FIXATION (ANESTHESIA CHOICE, POPLITEAL BLOCK, SAPHENOUS BLOCK, PERIPHERAL NERVE CATHETER) (Right) 2 Days Post-Op  Precautions: Weight Bearing, Fall Risk Right Lower Extremity Weight Bearing: Non Weight Bearing Left Lower Extremity Weight Bearing: Weight Bearing As Tolerated (with CAM Boot)                  ASSESSMENT:  Patient continues to benefit from skilled PT services and is progressing towards 
  Problem: Physical Therapy - Adult  Goal: By Discharge: Performs mobility at highest level of function for planned discharge setting.  See evaluation for individualized goals.  Description: FUNCTIONAL STATUS PRIOR TO ADMISSION: Patient was independent and active without use of DME.    HOME SUPPORT PRIOR TO ADMISSION: The patient lived with family but did not require assistance.    Physical Therapy Goals  Initiated 4/5/2024  1.  Patient will move from supine to sit and sit to supine, scoot up and down, and roll side to side in bed with modified independence within 7 day(s).    2.  Patient will perform sit to stand with modified independence within 7 day(s).  3.  Patient will transfer from bed to chair and chair to bed with modified independence using the least restrictive device within 7 day(s).  4.  Patient will ambulate with modified independence for 100 feet with the least restrictive device within 7 day(s).   5.  Patient will ascend/descend 7 stairs with  handrail(s) with supervision/set-up within 7 day(s).   Outcome: Progressing   PHYSICAL THERAPY TREATMENT    Patient: Lola Gupta (48 y.o. female)  Date: 4/7/2024  Diagnosis: Closed trimalleolar fracture of right ankle, initial encounter [S82.851A]  Fall, initial encounter [W19.XXXA]  Closed right trimalleolar fracture, initial encounter [S82.851A]  Sprain of left ankle, unspecified ligament, initial encounter [S93.402A] Closed right trimalleolar fracture  Procedure(s) (LRB):  OPEN REDUCTION INTERNAL FIXATION RIGHT TRIMALLEOLAR ANKLE FRACTURE WITH SYNDESMOSIS FIXATION (ANESTHESIA CHOICE, POPLITEAL BLOCK, SAPHENOUS BLOCK, PERIPHERAL NERVE CATHETER) (Right) 3 Days Post-Op  Precautions: Weight Bearing, Fall Risk Right Lower Extremity Weight Bearing: Non Weight Bearing Left Lower Extremity Weight Bearing: Weight Bearing As Tolerated (with CAM Boot)                  ASSESSMENT:  Patient continues to benefit from skilled PT services and is progressing towards 
progressing  
for appliance change or resting period.  Outcome: Adequate for Discharge     Problem: Safety - Adult  Goal: Free from fall injury  Outcome: Adequate for Discharge     Problem: ABCDS Injury Assessment  Goal: Absence of physical injury  Outcome: Adequate for Discharge     
towards goals. Pt report bad narcolepsy day\" reviewed HEP for BLE, demonstrates good quad set and able to SLR on RLE, improved L ankle ROM with reduced swelling. Pt demonstrates good understanding of WB restrictions and able to don/doff CAM boot on LLE and able to maintain NWB on RLE with short bout of gait training with RW support.           PLAN:  Patient continues to benefit from skilled intervention to address the above impairments.  Continue treatment per established plan of care.    Recommend with staff: up with assist x 1 with BSC transfers    Recommend next PT session: continue to progress as tolerated    Recommendation for discharge: (in order for the patient to meet his/her long term goals): Therapy 3 hours/day 5-7 days/week    Other factors to consider for discharge: patient's current support system is unable to meet their requirements for physical assistance, high risk for falls, and concern for safely navigating or managing the home environment    IF patient discharges home will need the following DME: continuing to assess with progress       SUBJECTIVE:   Patient stated, \"doing ok all things considered.\"    OBJECTIVE DATA SUMMARY:   Critical Behavior:          Functional Mobility Training:  Bed Mobility:  Bed Mobility Training  Supine to Sit: Stand-by assistance;Additional time  Sit to Supine: Stand-by assistance;Additional time  Scooting: Additional time;Stand-by assistance  Transfers:  Transfer Training  Interventions: Verbal cues  Sit to Stand: Minimum assistance;Assist X1;Additional time  Stand to Sit: Minimum assistance;Assist X1;Additional time  Balance:  Balance  Sitting: Intact  Standing: Impaired  Standing - Static: Fair;Constant support  Standing - Dynamic: Fair;Constant support   Ambulation/Gait Training:     Gait  Distance (ft): 16 Feet  Assistive Device: Brace/splint;Gait belt;Walker, rolling  Interventions: Safety awareness training;Verbal cues;Visual cues;Demonstration  Base of Support: 
 Patient will utilize energy conservation techniques during functional activities with verbal and visual cues within 7 day(s).   4/6/2024 1238 by Didi Galeana OTA  Outcome: Progressing     
transfers      Patient cont to use green thera band for upper extremities there ex      Activity Tolerance:   Good  Please refer to the flowsheet for vital signs taken during this treatment.    After treatment:   Patient left in no apparent distress in bed and Call bell within reach    COMMUNICATION/EDUCATION:   The patient's plan of care was discussed with: occupational therapist    Patient Education  Education Given To: Patient  Education Provided: Role of Therapy;Plan of Care  Education Method: Verbal  Barriers to Learning: None  Education Outcome: Verbalized understanding    Thank you for this referral.  CHIQUIS Nelson/L  Minutes: 17   
  History Examination Presentation Decision-Making   LOW Complexity : Zero comorbidities / personal factors that will impact the outcome / POC HIGH Complexity : 4+ Standardized tests and measures addressing body structure, function, activity limitation and / or participation in recreation  HIGH Complexity : Unstable and unpredictable characteristics  AM-PAC  HIGH    Based on the above components, the patient evaluation is determined to be of the following complexity level: High

## 2024-04-09 NOTE — PROGRESS NOTES
Orthopaedic Progress Note  Post Op day: 4 Days Post-Op    April 8, 2024 1:26 PM     Patient: Lola Gupta MRN: [id number]  SSN: [id number]    YOB: 1975  Age: 48 y.o.  Sex: female      Admit date:  4/2/2024  Procedures:  Procedure(s):  OPEN REDUCTION INTERNAL FIXATION RIGHT TRIMALLEOLAR ANKLE FRACTURE WITH SYNDESMOSIS FIXATION (ANESTHESIA CHOICE, POPLITEAL BLOCK, SAPHENOUS BLOCK, PERIPHERAL NERVE CATHETER)  Admitting Physician:  Lorenzo Cárdenas MD   Surgeon:  Surgeon(s) and Role:     * Lorenzo Cárdenas MD - Primary    Consulting Physician(s): Treatment Team: Attending Provider: Lorenzo Cárdenas MD; Consulting Provider: Marilyn Edmondson APRN - NP; Utilization Reviewer: Radha Mendoza RN; : Shanda Montalvo; Surgeon: Lorenzo Cárdenas MD; Registered Nurse: Edelmira Izquierdo RN; Occupational Therapist Assistant: Didi Galeana OTA; Patient Care Tech: Sandra Tolliver; Physical Therapist: Joyce Contreras PT; Physical Therapist Assistant: Jennifer Modi PTA    SUBJECTIVE:     Lola Gupta is a 48 y.o. female is 4 Days Post-Op s/p Procedure(s):  OPEN REDUCTION INTERNAL FIXATION RIGHT TRIMALLEOLAR ANKLE FRACTURE WITH SYNDESMOSIS FIXATION (ANESTHESIA CHOICE, POPLITEAL BLOCK, SAPHENOUS BLOCK, PERIPHERAL NERVE CATHETER) with an appropriate level of post-operative pain.  Denies dysuria or polyuria.  No complaints of nausea, vomiting, dizziness, lightheadedness, chest pain, or shortness of breath.    OBJECTIVE:       Physical Exam:  General: Alert, cooperative, no distress.    Respiratory: Respirations unlabored  Neurological:  Neurovascular exam within normal limits.  Motor: + DF/PF on the left.  Right lower leg is in a splint.    Musculoskeletal: Calves soft, supple, non-tender upon palpation.      RIGHT Lower extremity     Dressing: c/d/i        Motor: + ankle df/pf     Sensory: SILT     Vascular: Brisk capillary refill in toes     LLE:   TTP over lateral malleolus with mod edema and 
0930: Held pt lisinopril as BP on the lower side- surgery today    1000: Pt off floor to pre op  
Occupational Therapy Note:    Orders acknowledged and chart reviewed. Patient admitted from home with R ankle fx (NWB) and L ankle sprain (WBAT) with inability to ambulate. Planned for R ankle ORIF tomorrow (4/4). Pt prefers to defer OT and PT evaluations until after surgery. Will continue to follow and attempt OT evaluation post-operatively. Thank you.    Quynh Webb, OTR/L  
Occupational Therapy Note: patient stated unable to do anything right now just having returned from use of BEDSIDE COMMODE.Will cont to follow.  
Ortho Note    Subjective:    Lola Gupta is a 48 y.o. female who is POD 1 sp ORIF R bimalleolar ankle fx w/ syndesmosis fixation    Major Events:.    MARY overnight, pain well controlled     Objective:    Vital signs in last 24 hours:    [unfilled]    Temp (24hrs), Av.2 °F (36.8 °C), Min:97.9 °F (36.6 °C), Max:98.6 °F (37 °C)      Labs:    Lab Results   Component Value Date/Time    WBC 11.7 2024 12:00 PM    HGB 11.4 2024 12:00 PM    HCT 34.5 2024 12:00 PM     2024 12:00 PM       Lab Results   Component Value Date/Time     2024 12:00 PM    K 3.8 2024 12:00 PM     2024 12:00 PM    CO2 28 2024 12:00 PM    BUN 12 2024 12:00 PM    CREATININE 0.56 2024 12:00 PM    GLUCOSE 109 2024 12:00 PM       Physical Exam:    General: alert, cooperative, in NAD  Nonlabored resp    RIGHT Lower extremity    Dressing: c/d/i      Motor: + toe df/pf    Sensory: diminished 2/2 nerve block    Vascular: Brisk capillary refill in toes    Assessment/Plan:    · Pain management: as written    · DVT Prophylaxis: lovenox 30 mg sq BID to resume today at 9AM and cont while in hospital, ok to DC on ASA 81 mg BID x 6 weeks     · PT/OT: NWB to RLE, WBAT to LLE for ankle sprain    · Dispo: will need SNF placement due to difficulty maintaining NWB to RLE at home which is complicated by concurrent L ankle sprain.     [facility] [phone]  Fernando Castillo PA-C    
Ortho Note    Subjective:    Lola Gupta is a 48 y.o. female who is POD 2 sp ORIF R bimalleolar ankle fx w/ syndesmosis fixation     Major Events:.    Pain controlled, resting in bed this AM    Objective:    Vital signs in last 24 hours:    [unfilled]    Temp (24hrs), Av.1 °F (36.7 °C), Min:97.9 °F (36.6 °C), Max:98.4 °F (36.9 °C)      Labs:    Lab Results   Component Value Date/Time    WBC 11.7 2024 12:00 PM    HGB 11.4 2024 12:00 PM    HCT 34.5 2024 12:00 PM     2024 12:00 PM       Lab Results   Component Value Date/Time     2024 12:00 PM    K 3.8 2024 12:00 PM     2024 12:00 PM    CO2 28 2024 12:00 PM    BUN 12 2024 12:00 PM    CREATININE 0.56 2024 12:00 PM    GLUCOSE 109 2024 12:00 PM       Physical Exam:    General: alert, cooperative, in NAD  Nonlabored resp    RIGHT Lower extremity    Dressing: c/d/i      Motor: + ankle df/pf    Sensory: SILT    Vascular: Brisk capillary refill in toes    LLE:   TTP over lateral malleolus with mod edema and ecchymosis over lat ankle. ROM limited due to pain, BCR toes, SILT at foot       Assessment/Plan:     · Pain management: as written     · DVT Prophylaxis: lovenox 30 mg sq BID to resume today at 9AM and cont while in hospital, ok to DC on ASA 81 mg BID x 6 weeks      · PT/OT: NWB to RLE, WBAT to LLE for ankle sprain     · Dispo: pending insurance approval for IPR. Pt will need skilled IPR due to inability to maintain NWB to RLE which is complicated by severe L ankle sprain. The L ankle sprain is severely limiting her mobility and ability to comply with NWB to RLE and IPR is medically necessary for her to improve her strength and mobility in order to comply with NWB restrictions to RLE for at least 6 weeks from her date of surgery (4/4/24)    Fernando Castillo PA-C    
Ortho Note    Subjective:    Lola Gupta is a 48 y.o. female who is POD 3 sp ORIF R trimalleolar ankle fx w/ syndesmosis fixation     Major Events:.    Pain controlled, resting in bed this AM    Objective:    Vital signs in last 24 hours:    [unfilled]    Temp (24hrs), Av.1 °F (36.7 °C), Min:97.9 °F (36.6 °C), Max:98.4 °F (36.9 °C)      Labs:    Lab Results   Component Value Date/Time    WBC 11.7 2024 12:00 PM    HGB 11.4 2024 12:00 PM    HCT 34.5 2024 12:00 PM     2024 12:00 PM       Lab Results   Component Value Date/Time     2024 12:00 PM    K 3.8 2024 12:00 PM     2024 12:00 PM    CO2 28 2024 12:00 PM    BUN 12 2024 12:00 PM    CREATININE 0.56 2024 12:00 PM    GLUCOSE 109 2024 12:00 PM       Physical Exam:    General: alert, cooperative, in NAD  Nonlabored resp    RIGHT Lower extremity    Dressing: c/d/i      Motor: + ankle df/pf    Sensory: SILT    Vascular: Brisk capillary refill in toes    LLE:   TTP over lateral malleolus with mod edema and ecchymosis over lat ankle. ROM limited due to pain, BCR toes, SILT at foot       Assessment/Plan:     · Pain management: as written     · DVT Prophylaxis: lovenox 30 mg sq BID to resume today at 9AM and cont while in hospital, ok to DC on ASA 81 mg BID x 6 weeks      · PT/OT: NWB to RLE, WBAT to LLE for ankle sprain     · Dispo: pending insurance approval for IPR. Pt will need skilled IPR due to inability to maintain NWB to RLE which is complicated by severe L ankle sprain. The L ankle sprain is severely limiting her mobility and ability to comply with NWB to RLE and IPR is medically necessary for her to improve her strength and mobility in order to comply with NWB restrictions to RLE for at least 6 weeks from her date of surgery (4/4/24)    F/u withFernando Castillo PA-C as scheduled.  Ppw on chart    Lorenzo Cádrenas MD  
Ortho Note    Subjective:    Lola Gupta is a 48 y.o. female with RIGHT trimal ankle fx    Major Events:.    Pain controlled    Objective:    Vital signs in last 24 hours:    [unfilled]    Temp (24hrs), Av.3 °F (36.8 °C), Min:97.9 °F (36.6 °C), Max:98.7 °F (37.1 °C)      Labs:    Lab Results   Component Value Date/Time    WBC 11.7 2024 12:00 PM    HGB 11.4 2024 12:00 PM    HCT 34.5 2024 12:00 PM     2024 12:00 PM       Lab Results   Component Value Date/Time     2024 12:00 PM    K 3.8 2024 12:00 PM     2024 12:00 PM    CO2 28 2024 12:00 PM    BUN 12 2024 12:00 PM    CREATININE 0.56 2024 12:00 PM    GLUCOSE 109 2024 12:00 PM       Physical Exam:    General: alert, cooperative, in NAD  Pulm - Nonlabored resp - CTAB  Heart - RRR    RIGHT Lower extremity    Dressing: splint c/d/I  No visible blisters/bleeding  Small superficial abrasion ~1cm in length over anteromedial ankle      Motor: + toe df/pf    Sensory: SILT    Vascular: Brisk capillary refill in toes    Assessment/Plan:    · Pain management: as written    · DVT Prophylaxis: hold for OR    · PT/OT: NWB RLE      Plan OR today for ORIF RIGHT trimalleolar ankle fx with possible syndesmosis fixation.  Also discussed the possibility of closed reduction/external fixation if blisters are present or her ankle is too swollen in areas not examined through window in the anterior splint.    NPO  Bedrest  Pain control      Lorenzo Cárdenas MD    
Patient Fall Protocol  Yellow arm band applied to patient and yellow non skid socks placed on  Bed in low position, all side rails up, call bell in reach  Pt and Family instructed in \"call- don't fall\" protocol   -use your call bell, wait for assistance, staff not family will assist you to get up and move about   Pt and family verbalize understanding of fall precautions and the \"call don't fall\" Protocol    
Patient has complaints numbness under toes and tingling throughout foot on right side. Notified Orthopedic Surgery on-call physician with orders to keep extremity elevated as much as possible.   
Physical Therapy orders acknowledged, chart reviewed and discussed with nurse patient off the floor to OR. We will continue to follow up with the patient for therapy.  
Physical Therapy:     Order received and appreciated. Patient admitted from home with R ankle fx (NWB) and L ankle sprain (WBAT) with inability to ambulate. Planned for R ankle ORIF tomorrow (4/4). Spoke with patient, who prefers to delay PT/OT evaluations until after surgery and rest today. Encouraged patient to elevate BLE's and to perform gentle ROM of L ankle and digits as tolerated. Will follow up post-operatively.    Thank you,  Tashi De Santiago, PT, DPT  
Urinalysis was sent for culture.  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  She had blood on her urine so will have her see urology in followup unless this was done after [facility]rison placement.   No s/sx of nephrolithiasis.  No history of urogential cancer.    
  Component Value Date/Time     04/03/2024 12:00 PM    K 3.8 04/03/2024 12:00 PM     04/03/2024 12:00 PM    CO2 28 04/03/2024 12:00 PM    BUN 12 04/03/2024 12:00 PM       PT/OT:                Patient mobility                         ASSESSMENT / PLAN:   Principal Problem:    Closed right trimalleolar fracture  Active Problems:    Left ankle sprain    Closed right trimalleolar fracture, initial encounter  Resolved Problems:    * No resolved hospital problems. *          A: 1. Right ankle trimalleolar fracture dislocation  2. Left lateral ankle sprain    P: 1. Plan for OR tomorrow for ORIF with Dr. Cárdenas.  Strict elevation.  Will order blue wedge pillow.    2. Left ankle: Lateral ankle sprain.  WBAT.  Will get CAM walker following surgery.   3. DVT ppx: Previous history of DVT.  Lovenox 30 mg SC BID.  Will hold 12 hours prior to surgery.  4. Leukocytosis: reactive.  Will monitor.  UA/UC ordered.  Repeat CBC in am.   5.  Anemia: ABLA.  From fracture- not dilutional.  Monitor.  FOBT if worsening- history of GI workup for abdominal pain.   6. DISPO: SNF      Signed By:  NEVILLE Zamudio    [facility]  [facility]      
[facility]

## 2024-04-09 NOTE — CARE COORDINATION
4/5/2024 3:22 PM [facility] has accepted pt and submitted for auth. CM met with pt and confirmed [facility] is preference for IPR.     4/5/2024 1:55 PM CM notified [facility] and [facility] via [facility] of pt's PT and OT evals in chart.     4/5/2024 10:46 AM Spoke with treating PT and OT, recommendation for discharge is IPR.  CM met with pt and pt's  at bedside, provided IPR choice, choices selected and sent via Careport.     4/5/2024  8:31 AM   Care Management Progress Note    Admitting diagnosis:  Closed trimalleolar fracture of right ankle, initial encounter [S82.851A]  Fall, initial encounter [W19.XXXA]  Closed right trimalleolar fracture, initial encounter [S82.851A]  Sprain of left ankle, unspecified ligament, initial encounter [S93.402A]    Admit Date:  4/2/2024 12:41 PM    RUR: 8%   Risk Level: [x]Low []Moderate []High    Transition of care plan:  Ongoing management by Ortho  S/p ORIF right trimalleolar ankle fracture and ORIF Right syndesmosis on 4/4  PT and OT consulted: Yes, evals today  Anticipated discharge plan: IPR, referrals sent to:  [facility]- accepted and starting auth   [facility]  Pt will need auth for placement   Discharge transport: TBD         04/05/24 1045   Discharge Planning   Patient expects to be discharged to: Acute rehab   Services At/After Discharge   Transition of Care Consult (CM Consult) Acute Rehab   Services At/After Discharge Inpatient rehab   Condition of Participation: Discharge Planning   The Plan for Transition of Care is related to the following treatment goals: IPR   The Patient and/or Patient Representative was provided with a Choice of Provider? Patient   The Patient and/Or Patient Representative agree with the Discharge Plan? Yes   Freedom of Choice list was provided with basic dialogue that supports the patient's individualized plan of care/goals, treatment preferences, and shares the quality data associated with the 
4/9/2024 12:40 PM     Nursing please call report to [facility] at [phone]. Pt going to room 3042. Accepting MD is Dr. Osullivan.     4/9/2024 11:21 AM CM confirmed with [place] at [facility] they can accept pt today after 3PM. Confirmed discharge plan with pt, pt agreeable.  Awaiting report and room number from [facility].   Pt's RN is aware of discharge timing.  SORAYA HorvathW     Care Management Progress Note     Admitting diagnosis:  Closed trimalleolar fracture of right ankle, initial encounter [S82.851A]  Fall, initial encounter [W19.XXXA]  Closed right trimalleolar fracture, initial encounter [S82.851A]  Sprain of left ankle, unspecified ligament, initial encounter [S93.402A]     Admit Date:  4/2/2024 12:41 PM     RUR:  11%  Risk Level: [x]Low []Moderate []High     Transition of care plan:  Discharge to [facility] today   PT and OT consulted: Yes  Anticipated discharge plan: IPR  [facility] has accepted and auth approved  Outpatient follow-up.  Discharge transport: Pt's , pt cannot leave prior to 3PM.        04/09/24 1237   Discharge Planning   Patient expects to be discharged to: Acute rehab   Services At/After Discharge   Transition of Care Consult (CM Consult) Acute Rehab   Services At/After Discharge Inpatient rehab   Mode of Transport at Discharge Other (see comment)  (Pt's )   Condition of Participation: Discharge Planning   The Plan for Transition of Care is related to the following treatment goals: IPR   The Patient and/or Patient Representative was provided with a Choice of Provider? Patient   The Patient and/Or Patient Representative agree with the Discharge Plan? Yes   Freedom of Choice list was provided with basic dialogue that supports the patient's individualized plan of care/goals, treatment preferences, and shares the quality data associated with the providers?  Yes       
for progress and PT/OT eval post surgery tomorrow.     Gabbie Magdaleno RN, [facility] Care Management

## 2024-04-09 NOTE — DISCHARGE INSTRUCTIONS
Lorenzo Cárdenas MD  Foot and Ankle Surgery  General Orthopaedics      POST-OP Instructions      BLOOD CLOTS: To prevent blood clot formation, you have been prescribed Lovenox 40mg subcutaneous once daily for four weeks. After the lovenox has been completed you will then transition to aspirin 81mg tablets to be taken by mouth twice daily for 2 weeks.     PAIN CONTROL: For pain control, you have been prescribed narcotic    medication (Oxycodone or Norco). Take as prescribed and wean as able.     ***VERY IMPORTANT - PLEASE READ***.  If you were given a nerve block by the anesthesia team to numb your leg, you must start taking pain medication BEFORE the block wears off EVEN IF YOU ARE NOT HAVING PAIN.  If you do not start taking pain medication before the block wears off, you will be behind on pain control.  The block will usually wear off in 12-24 hours after surgery, so you must have pain medication in your system before the block wears off.    Take pain medication with food if possible to minimize stomach irritation. You may    take tylenol (acetaminophen) with Oxycodone but not with medications that    already have acetaminophen in them such as Norco. While taking narcotics, you    may have constipation. A stool softener is recommended-- you may use an over-    the-counter stool softener or use the one that has been prescribed for you. We    recommend no ibuprofen (Advil, Motrin, etc) since there are some studies that    show it may interfere with bone healing. Do not drink alcohol or drive while using    narcotic pain medication.            *** VERY IMPORTANT - PLEASE READ*** Please monitor the supply of your pain medicine closely and if it looks like you're going to run out of pain medicine over the weekend please call the office on THURSDAY BEFORE 3pm prior to the weekend to request a refill.  The on call physician for nights and weekends CANNOT refill pain medicine.  You will either have to wait until Monday

## (undated) DEVICE — SPONGE GZ W4XL4IN COT RADPQ HIGHLY ABSRB

## (undated) DEVICE — SUTURE MONOCRYL SZ 3-0 L27IN ABSRB UD L19MM PS-2 3/8 CIR PRIM Y427H

## (undated) DEVICE — PADDING CAST W6INXL4YD ST COT RAYON MICROPLEATED HIGHLY

## (undated) DEVICE — SPONGE GZ W4XL4IN COT 12 PLY TYP VII WVN C FLD DSGN STERILE

## (undated) DEVICE — SPEEDGUIDE DRILL AO: Brand: VARIAX

## (undated) DEVICE — SUTURE VICRYL SZ 0 L36IN ABSRB UD L36MM CT-1 1/2 CIR J946H

## (undated) DEVICE — BANDAGE COMPR W6INXL10YD ST M E WHITE/BEIGE

## (undated) DEVICE — DRILL BIT, AO DIA2.6MM X 135MM, SCALED: Brand: VARIAX

## (undated) DEVICE — GLOVE ORANGE PI 7 1/2   MSG9075

## (undated) DEVICE — ZIP 16 SURGICAL SKIN CLOSURE DEVICE: Brand: ZIP 16 SURGICAL SKIN CLOSURE DEVICE

## (undated) DEVICE — EXTREMITY-SFMCASU: Brand: MEDLINE INDUSTRIES, INC.

## (undated) DEVICE — SUTURE NONABSORBABLE MONOFILAMENT 3-0 PS-1 18 IN BLK ETHILON 1663H

## (undated) DEVICE — CANISTER, RIGID, 3000CC: Brand: MEDLINE INDUSTRIES, INC.

## (undated) DEVICE — SOLUTION IRRIG 1000ML STRL H2O USP PLAS POUR BTL

## (undated) DEVICE — PAD,ABDOMINAL,5"X9",ST,LF,25/BX: Brand: MEDLINE INDUSTRIES, INC.

## (undated) DEVICE — CLOSURE SKIN FLX NONINVASIVE PRELOC TECHNOLOGY FOR 24IN

## (undated) DEVICE — COVER,TABLE,HVY DUTY,EXT,77"X96",STRL: Brand: MEDLINE

## (undated) DEVICE — TOURNIQUET SURGICAL LG ORANGE HEMACLEAR

## (undated) DEVICE — OVERDRILL AO, DIA3.5MM X 122MM: Brand: VARIAX

## (undated) DEVICE — GLOVE ORTHO 8   MSG9480

## (undated) DEVICE — KIT ARMOR C DRP COLLAPSIBLE AND SELF EXP TOP CVR FOR FLUOROSCOPIC

## (undated) DEVICE — SOLUTION SCRB 4% CHG RED ANTIMIC SKIN CLN PREOPERATIVE DISP

## (undated) DEVICE — GLOVE SURG SZ 85 L12IN FNGR THK79MIL GRN LTX FREE